# Patient Record
Sex: MALE | Race: WHITE | NOT HISPANIC OR LATINO | Employment: FULL TIME | URBAN - METROPOLITAN AREA
[De-identification: names, ages, dates, MRNs, and addresses within clinical notes are randomized per-mention and may not be internally consistent; named-entity substitution may affect disease eponyms.]

---

## 2018-03-15 ENCOUNTER — OFFICE VISIT CONVERTED (OUTPATIENT)
Dept: GASTROENTEROLOGY | Facility: CLINIC | Age: 53
End: 2018-03-15
Attending: PHYSICIAN ASSISTANT

## 2018-04-18 ENCOUNTER — CONVERSION ENCOUNTER (OUTPATIENT)
Dept: GASTROENTEROLOGY | Facility: CLINIC | Age: 53
End: 2018-04-18

## 2018-04-18 ENCOUNTER — OFFICE VISIT CONVERTED (OUTPATIENT)
Dept: GASTROENTEROLOGY | Facility: CLINIC | Age: 53
End: 2018-04-18
Attending: INTERNAL MEDICINE

## 2018-07-13 ENCOUNTER — OFFICE VISIT CONVERTED (OUTPATIENT)
Dept: SURGERY | Facility: CLINIC | Age: 53
End: 2018-07-13
Attending: UROLOGY

## 2019-03-27 ENCOUNTER — OFFICE VISIT CONVERTED (OUTPATIENT)
Dept: FAMILY MEDICINE CLINIC | Facility: CLINIC | Age: 54
End: 2019-03-27
Attending: NURSE PRACTITIONER

## 2019-03-27 ENCOUNTER — CONVERSION ENCOUNTER (OUTPATIENT)
Dept: FAMILY MEDICINE CLINIC | Facility: CLINIC | Age: 54
End: 2019-03-27

## 2019-09-30 ENCOUNTER — CONVERSION ENCOUNTER (OUTPATIENT)
Dept: FAMILY MEDICINE CLINIC | Facility: CLINIC | Age: 54
End: 2019-09-30

## 2019-09-30 ENCOUNTER — OFFICE VISIT CONVERTED (OUTPATIENT)
Dept: FAMILY MEDICINE CLINIC | Facility: CLINIC | Age: 54
End: 2019-09-30
Attending: NURSE PRACTITIONER

## 2020-04-09 ENCOUNTER — TELEMEDICINE CONVERTED (OUTPATIENT)
Dept: FAMILY MEDICINE CLINIC | Facility: CLINIC | Age: 55
End: 2020-04-09
Attending: NURSE PRACTITIONER

## 2021-01-06 ENCOUNTER — OFFICE VISIT CONVERTED (OUTPATIENT)
Dept: FAMILY MEDICINE CLINIC | Facility: CLINIC | Age: 56
End: 2021-01-06
Attending: NURSE PRACTITIONER
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
## 2021-05-14 VITALS
DIASTOLIC BLOOD PRESSURE: 82 MMHG | SYSTOLIC BLOOD PRESSURE: 131 MMHG | BODY MASS INDEX: 33.59 KG/M2 | WEIGHT: 248 LBS | HEART RATE: 90 BPM | HEIGHT: 72 IN | OXYGEN SATURATION: 96 %
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
## 2021-05-15 VITALS
WEIGHT: 265.25 LBS | DIASTOLIC BLOOD PRESSURE: 88 MMHG | BODY MASS INDEX: 35.93 KG/M2 | SYSTOLIC BLOOD PRESSURE: 128 MMHG | HEART RATE: 120 BPM | HEIGHT: 72 IN | OXYGEN SATURATION: 96 %

## 2021-05-15 VITALS
HEIGHT: 72 IN | DIASTOLIC BLOOD PRESSURE: 96 MMHG | SYSTOLIC BLOOD PRESSURE: 139 MMHG | OXYGEN SATURATION: 96 % | BODY MASS INDEX: 35.69 KG/M2 | HEART RATE: 75 BPM | WEIGHT: 263.5 LBS

## 2021-05-16 VITALS — BODY MASS INDEX: 34.27 KG/M2 | RESPIRATION RATE: 16 BRPM | HEIGHT: 72 IN | WEIGHT: 253 LBS

## 2021-05-16 VITALS
RESPIRATION RATE: 12 BRPM | OXYGEN SATURATION: 97 % | DIASTOLIC BLOOD PRESSURE: 80 MMHG | SYSTOLIC BLOOD PRESSURE: 150 MMHG | BODY MASS INDEX: 34.17 KG/M2 | WEIGHT: 252.25 LBS | HEIGHT: 72 IN | HEART RATE: 94 BPM

## 2021-05-16 VITALS
DIASTOLIC BLOOD PRESSURE: 93 MMHG | BODY MASS INDEX: 34.28 KG/M2 | SYSTOLIC BLOOD PRESSURE: 158 MMHG | RESPIRATION RATE: 12 BRPM | HEIGHT: 72 IN | WEIGHT: 253.12 LBS | HEART RATE: 99 BPM

## 2021-12-23 PROBLEM — E78.1 HYPERTRIGLYCERIDEMIA: Status: ACTIVE | Noted: 2021-12-23

## 2021-12-23 PROBLEM — F41.9 ANXIETY: Status: ACTIVE | Noted: 2021-12-23

## 2021-12-23 PROBLEM — E78.5 HYPERLIPIDEMIA: Status: ACTIVE | Noted: 2021-12-23

## 2021-12-23 PROBLEM — M54.50 LOW BACK PAIN: Status: ACTIVE | Noted: 2021-12-23

## 2021-12-23 PROBLEM — R42 VERTIGO: Status: ACTIVE | Noted: 2021-12-23

## 2021-12-23 PROBLEM — K21.9 ESOPHAGEAL REFLUX: Status: ACTIVE | Noted: 2021-12-23

## 2021-12-23 PROBLEM — F32.A DEPRESSION: Status: ACTIVE | Noted: 2021-12-23

## 2021-12-23 PROBLEM — K76.9 LIVER DISORDER: Status: ACTIVE | Noted: 2021-12-23

## 2021-12-23 PROBLEM — F43.10 PTSD (POST-TRAUMATIC STRESS DISORDER): Status: ACTIVE | Noted: 2021-12-23

## 2021-12-23 PROBLEM — K50.90 CROHN'S DISEASE: Status: ACTIVE | Noted: 2021-12-23

## 2021-12-23 RX ORDER — TRAZODONE HYDROCHLORIDE 100 MG/1
100 TABLET ORAL NIGHTLY
COMMUNITY

## 2021-12-23 RX ORDER — ASPIRIN 81 MG/1
TABLET ORAL
COMMUNITY
End: 2022-01-06

## 2021-12-23 RX ORDER — FEXOFENADINE HCL 180 MG/1
180 TABLET ORAL DAILY
COMMUNITY

## 2021-12-23 RX ORDER — CITALOPRAM 40 MG/1
TABLET ORAL
COMMUNITY
End: 2022-01-06

## 2021-12-23 RX ORDER — PANTOPRAZOLE SODIUM 40 MG/1
TABLET, DELAYED RELEASE ORAL
COMMUNITY
End: 2022-12-08

## 2021-12-23 RX ORDER — PRAZOSIN HYDROCHLORIDE 2 MG/1
8 CAPSULE ORAL NIGHTLY
COMMUNITY

## 2021-12-23 RX ORDER — ROSUVASTATIN CALCIUM 5 MG/1
TABLET, COATED ORAL
COMMUNITY
End: 2022-01-06

## 2021-12-23 RX ORDER — QUETIAPINE 200 MG/1
200 TABLET, FILM COATED, EXTENDED RELEASE ORAL NIGHTLY
COMMUNITY

## 2021-12-23 RX ORDER — SILDENAFIL 100 MG/1
100 TABLET, FILM COATED ORAL AS NEEDED
COMMUNITY

## 2021-12-23 RX ORDER — MESALAMINE 1.2 G/1
1200 TABLET, DELAYED RELEASE ORAL 4 TIMES DAILY
COMMUNITY

## 2021-12-23 RX ORDER — EPINEPHRINE 0.3 MG/.3ML
0.3 INJECTION SUBCUTANEOUS ONCE
COMMUNITY

## 2021-12-23 RX ORDER — ROSUVASTATIN CALCIUM 40 MG/1
20 TABLET, COATED ORAL NIGHTLY
COMMUNITY

## 2021-12-23 RX ORDER — HYDROXYZINE 50 MG/1
50 TABLET, FILM COATED ORAL NIGHTLY
COMMUNITY

## 2021-12-23 RX ORDER — RANITIDINE 150 MG/1
CAPSULE ORAL
COMMUNITY
End: 2022-01-06

## 2021-12-23 RX ORDER — OXCARBAZEPINE 300 MG/1
TABLET, FILM COATED ORAL
COMMUNITY
End: 2022-01-06

## 2021-12-23 RX ORDER — ERGOCALCIFEROL 1.25 MG/1
50000 CAPSULE ORAL WEEKLY
COMMUNITY

## 2021-12-23 RX ORDER — TAMSULOSIN HYDROCHLORIDE 0.4 MG/1
1 CAPSULE ORAL DAILY
COMMUNITY

## 2021-12-23 RX ORDER — LAMOTRIGINE 200 MG/1
TABLET ORAL
COMMUNITY
End: 2022-01-06

## 2021-12-23 RX ORDER — ONDANSETRON 8 MG/1
8 TABLET, ORALLY DISINTEGRATING ORAL EVERY 8 HOURS PRN
COMMUNITY

## 2022-01-06 ENCOUNTER — OFFICE VISIT (OUTPATIENT)
Dept: FAMILY MEDICINE CLINIC | Facility: CLINIC | Age: 57
End: 2022-01-06

## 2022-01-06 VITALS
DIASTOLIC BLOOD PRESSURE: 80 MMHG | HEART RATE: 134 BPM | HEIGHT: 72 IN | OXYGEN SATURATION: 95 % | BODY MASS INDEX: 34.95 KG/M2 | WEIGHT: 258 LBS | SYSTOLIC BLOOD PRESSURE: 122 MMHG

## 2022-01-06 DIAGNOSIS — Z00.00 ANNUAL PHYSICAL EXAM: Primary | ICD-10-CM

## 2022-01-06 PROCEDURE — 99396 PREV VISIT EST AGE 40-64: CPT | Performed by: NURSE PRACTITIONER
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
## 2022-02-11 ENCOUNTER — TRANSCRIBE ORDERS (OUTPATIENT)
Dept: PHYSICAL THERAPY | Facility: CLINIC | Age: 57
End: 2022-02-11

## 2022-02-11 DIAGNOSIS — R42 DIZZINESS AND GIDDINESS: Primary | ICD-10-CM

## 2022-03-22 ENCOUNTER — TREATMENT (OUTPATIENT)
Dept: PHYSICAL THERAPY | Facility: CLINIC | Age: 57
End: 2022-03-22

## 2022-03-22 DIAGNOSIS — R26.89 IMBALANCE: ICD-10-CM

## 2022-03-22 DIAGNOSIS — R42 DIZZINESS: Primary | ICD-10-CM

## 2022-03-22 DIAGNOSIS — R26.9 GAIT DISTURBANCE: ICD-10-CM

## 2022-03-22 PROCEDURE — 95992 CANALITH REPOSITIONING PROC: CPT | Performed by: PHYSICAL THERAPIST

## 2022-03-22 PROCEDURE — 97161 PT EVAL LOW COMPLEX 20 MIN: CPT | Performed by: PHYSICAL THERAPIST
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
## 2022-04-04 ENCOUNTER — TREATMENT (OUTPATIENT)
Dept: PHYSICAL THERAPY | Facility: CLINIC | Age: 57
End: 2022-04-04

## 2022-04-04 DIAGNOSIS — R42 DIZZINESS: ICD-10-CM

## 2022-04-04 DIAGNOSIS — R26.9 GAIT DISTURBANCE: Primary | ICD-10-CM

## 2022-04-04 DIAGNOSIS — R26.89 IMBALANCE: ICD-10-CM

## 2022-04-04 PROCEDURE — 95992 CANALITH REPOSITIONING PROC: CPT | Performed by: PHYSICAL THERAPIST

## 2022-04-04 PROCEDURE — 97112 NEUROMUSCULAR REEDUCATION: CPT | Performed by: PHYSICAL THERAPIST

## 2022-04-04 NOTE — PROGRESS NOTES
Physical Therapy Daily Progress Note    VISIT#: 2    Subjective   Chai Gaston reports he has been doing his exercises consistently at home.  He states his symptoms are getting a little better since starting them.  He reports he has fallen twice since I last saw him due to his R leg dragging after doing visual exercises.     Objective   L tahir halpike: negative  R tahir halpike: positive  Horizontal canal test: NT     Smooth pursuits horizontal: dizziness  Smooth pursuits vertical: dizziness  Saccades horizontal: dizziness, increased speed today  Saccades vertical: dizziness,  increased speed today  VOR horizontal: dizziness, increased speed today  VOR vertical: dizziness,  increased speed today    See Exercise, Manual, and Modality Logs for complete treatment.     Patient Education: add candie-za to HEP    Assessment/Plan  Pt had positive tahir halpike to the R side today therefore epley maneuver was performed two times to that side.  Pt shows improved speed on saccades and VOR horizontal/vertical today compared to the initial evaluation.  Whyte-za was provided for home program.  Will perform tahir halpike again next session.    Progress per Plan of Care and Progress strengthening /stabilization /functional activity            Timed:         Manual Therapy:        mins  04584;     Therapeutic Exercise:         mins  07161;     Neuromuscular Graeme:    10    mins  91634;    Therapeutic Activity:          mins  64310;     Gait Training:           mins  01425;     Ultrasound:          mins  33868;    Ionto                                   mins   57516  Self Care                            mins   55488      Un-Timed:  Electrical Stimulation:         mins  52503 ( );  Dry Needling          mins self-pay  Traction          mins 60256  Low Eval          Mins  86312  Mod Eval          Mins  45791  High Eval                            Mins  88258  Re-Eval                               mins  21174  Canalith  Repos               15    mins  01486    Timed Treatment:   10   mins   Total Treatment:     25   mins    Sandra Boone PT  Physical Therapist  [place] License: [id number]

## 2022-04-14 ENCOUNTER — TREATMENT (OUTPATIENT)
Dept: PHYSICAL THERAPY | Facility: CLINIC | Age: 57
End: 2022-04-14

## 2022-04-14 DIAGNOSIS — R26.9 GAIT DISTURBANCE: Primary | ICD-10-CM

## 2022-04-14 DIAGNOSIS — R26.89 IMBALANCE: ICD-10-CM

## 2022-04-14 DIAGNOSIS — R42 DIZZINESS: ICD-10-CM

## 2022-04-14 PROCEDURE — 97112 NEUROMUSCULAR REEDUCATION: CPT | Performed by: PHYSICAL THERAPIST

## 2022-04-14 NOTE — PROGRESS NOTES
Physical Therapy Daily Progress Note    VISIT#: 3    Subjective   Chai Gaston reports he has been doing the eye exercises at home and they are getting easier.  Pt reports dizziness when getting up out of a chair or out of a car.  He states it is really bad if he is driving, especially in traffic.      Objective   L tahir halpike: negative  R tahir halpike: negative  Horizontal canal test: negative    Romberg on floor: 30 seconds  Romberg on floor/eyes closed: 4 seconds  Romberg on foam: 4 seconds  Romberg on foam/eyes closed: not tested  Sharpened romberg on floor: 5 seconds  Sharpened romberg on floor/eyes closed: not tested  Sharpened romberg on foam: not tested  Sharpened romberg on foam/eyes closed: not tested    See Exercise, Manual, and Modality Logs for complete treatment.     Assessment/Plan  Tahir halpike is negative today therefore epley was not performed.  However, pt does have nausea during testing.  Baseline balance testing was performed today and pt has a significant balance deficit that is worse with eyes closed.  Whyte daroff was added today as well and pt was instructed to add this to his home program and provided with a handout.  Pt has poor tolerance of whyte-daroff due to nausea.  Will continue advancing balance/visual/vestibular exercises to improve tolerance to functional activities.     Progress per Plan of Care and Progress strengthening /stabilization /functional activity            Timed:         Manual Therapy:         mins  62491;     Therapeutic Exercise:         mins  15912;     Neuromuscular Graeme:    26    mins  21928;    Therapeutic Activity:          mins  88290;     Gait Training:           mins  61756;     Ultrasound:          mins  29040;    Ionto                                   mins   97644  Self Care                            mins   96670  Canalith Repos                   mins  05906    Un-Timed:  Electrical Stimulation:         mins  79297 ( );  Dry Needling           mins self-pay  Traction          mins 74522  Low Eval          Mins  84411  Mod Eval          Mins  61977  High Eval                            Mins  52290  Re-Eval                               mins  65906    Timed Treatment:   26   mins   Total Treatment:     26   mins    Sandra Boone PT, DPT  License number [id number]

## 2022-04-15 ENCOUNTER — TREATMENT (OUTPATIENT)
Dept: PHYSICAL THERAPY | Facility: CLINIC | Age: 57
End: 2022-04-15

## 2022-04-15 DIAGNOSIS — R26.89 IMBALANCE: ICD-10-CM

## 2022-04-15 DIAGNOSIS — R26.9 GAIT DISTURBANCE: Primary | ICD-10-CM

## 2022-04-15 DIAGNOSIS — R42 DIZZINESS: ICD-10-CM

## 2022-04-15 PROCEDURE — 97112 NEUROMUSCULAR REEDUCATION: CPT | Performed by: PHYSICAL THERAPIST

## 2022-04-15 NOTE — PROGRESS NOTES
Physical Therapy Daily Progress Note    VISIT#: 4    Subjective   Chai Gaston reports feeling very sick today. He had a lot of nausea while driving here today.     Objective     See Exercise, Manual, and Modality Logs for complete treatment.       Assessment/Plan  Pt continues to have significant loss of balance with exercises where his eyes are closed.  Pt requires prolonged rest breaks in between exercises to get rid of his nausea. Pt has frequent loss of balance during exercises however he demonstrates abnormal balance strategies in order to recover balance, such as excessive UE movement.  During balance exercises pt was able to regain balance without PT assistance.  Will continue advancing exercises as tolerated.     Progress per Plan of Care and Progress strengthening /stabilization /functional activity            Timed:         Manual Therapy:         mins  42103;     Therapeutic Exercise:         mins  84569;     Neuromuscular Graeme:    27    mins  17745;    Therapeutic Activity:          mins  06845;     Gait Training:           mins  71451;     Ultrasound:          mins  08846;    Ionto                                   mins   56542  Self Care                            mins   45198  Canalith Repos                   mins  00914    Un-Timed:  Electrical Stimulation:         mins  85898 ( );  Dry Needling          mins self-pay  Traction          mins 95689  Low Eval          Mins  34602  Mod Eval          Mins  70641  High Eval                            Mins  28092  Re-Eval                               mins  67015    Timed Treatment:   27   mins   Total Treatment:     27   mins    Sandra Boone PT, DPT  License number: [id number]

## 2022-04-19 ENCOUNTER — TREATMENT (OUTPATIENT)
Dept: PHYSICAL THERAPY | Facility: CLINIC | Age: 57
End: 2022-04-19

## 2022-04-19 PROCEDURE — 97112 NEUROMUSCULAR REEDUCATION: CPT | Performed by: PHYSICAL THERAPIST

## 2022-04-19 NOTE — PROGRESS NOTES
Physical Therapy Daily Progress Note    VISIT#: 5    Subjective   Chai Gaston reports being tired and feeling a little less dizzy today. Pt reports still having dizziness when getting out of bed. Pt reports having more dizziness with HEP involving moving head up and down.      Objective     See Exercise, Manual, and Modality Logs for complete treatment.       Assessment/Plan  Pt is still experiencing significant dizziness when performing head turns.  Pt tolerated standing in romberg with head turns better with not as much nausea till the end of the second set. Pt seemed to not have as much loss of balance when performing his exercises today. Pt was able to use stepping strategies a couple times to regain his balance during exercises. Pt will benefit from progressing his balance exercises to decrease his nausea and dizziness during his daily activities.     Progress per Plan of Care and Progress strengthening /stabilization /functional activity            Timed:         Manual Therapy:         mins  19498;     Therapeutic Exercise:         mins  82151;     Neuromuscular Graeme:    30    mins  25005;    Therapeutic Activity:          mins  69454;     Gait Training:           mins  49899;     Ultrasound:          mins  15055;    Ionto                                   mins   62703  Self Care                            mins   58718  Canalith Repos                   mins  43257    Un-Timed:  Electrical Stimulation:         mins  63197 ( );  Dry Needling          mins self-pay  Traction          mins 02440  Low Eval          Mins  01695  Mod Eval          Mins  70379  High Eval                            Mins  04013  Re-Eval                               mins  20242    Timed Treatment:   30   mins   Total Treatment:     30   mins    Destiney Faulkner Physical therapist student

## 2022-04-21 ENCOUNTER — TREATMENT (OUTPATIENT)
Dept: PHYSICAL THERAPY | Facility: CLINIC | Age: 57
End: 2022-04-21

## 2022-04-21 DIAGNOSIS — R26.89 IMBALANCE: ICD-10-CM

## 2022-04-21 DIAGNOSIS — R26.9 GAIT DISTURBANCE: ICD-10-CM

## 2022-04-21 DIAGNOSIS — R42 DIZZINESS: Primary | ICD-10-CM

## 2022-04-21 PROCEDURE — 97112 NEUROMUSCULAR REEDUCATION: CPT | Performed by: PHYSICAL THERAPIST

## 2022-04-21 NOTE — PROGRESS NOTES
Progress Assessment        Patient: Chai Gaston   : 1965  Diagnosis/ICD-10 Code:  Dizziness [R42]  Referring practitioner: MARCO Campuzano  Date of Initial Visit: Type: THERAPY  Noted: 3/22/2022  Today's Date: 2022  Patient seen for 6 sessions      Subjective:   Chai Gaston reports: driving is still very difficult especially on the freeways. Pt reports his HEP up and down exercises still make him dizzy but the side to side has gotten easier. Pt is still having to move slowly to get in and out of bed to decrease how much nausea he experiences. Pt reports that he still has some of the flickering of his eyes, but it has improved. Pt reports that his balance has gotten a little better except when standing on foam. Pt reports having his R foot drag when he's walking sometimes. Pt reports nausea has gotten better.     Subjective Questionnaire: DHI: 70%  Clinical Progress: improved  Home Program Compliance: Yes  Treatment has included: therapeutic exercise and neuromuscular re-education    Subjective     Objective     Smooth pursuits horizontal: dizziness  Smooth pursuits vertical: dizziness  Saccades horizontal: less dizziness, normal speed  Saccades vertical: dizziness, improved speed speed  VOR horizontal: dizziness, decreased speed  VOR vertical: Less dizziness, improved speed  Near point convergence: NT     Romberg on floor: 30 seconds  Romberg on floor/eyes closed: 7 seconds  Romberg on foam: 18 seconds  Romberg on foam/eyes closed: not tested  Sharpened romberg on floor: 8 seconds  Sharpened romberg on floor/eyes closed: not tested  Sharpened romberg on foam: not tested  Sharpened romberg on foam/eyes closed: not tested    Assessment/Plan   Pt's balance has improved some but continues to have significant difficulty when standing on the foam or having eyes closed. Pt continues to have frequent loss of balance during exercises and demonstrates abnormal balance strategies including  excessive UE movement. Pt score on the DHI has improved indicating greater tolerance to functional activities. Pt will continue to benefit from habituation exercises to decrease symptoms of nausea and dizziness.     Goals  Plan Goals: 1. Mobility: Walking/Moving Around Functional Limitation                       LTG 1: 12 weeks:  The patient will demonstrate 40% limitation by achieving a score of 40 on the Dizziness Handicap Inventory.   STATUS:  progressing  STG 1a: 6 weeks:  The patient will demonstrate 50% limitation by achieving a score of 50 on the Dizziness Handicap Inventory.     STATUS:  progressing      2. The patient has difficulty with balance.   LTG 2: 12 weeks: The patient will hold the sharpened romberg position on floor with eyes closed for 30 seconds in order to improve balance and decrease risk of falling.   STATUS: progressing   STG 2: 6 weeks: The patient will hold the romberg position on foam with eyes closed for 30 seconds in order to improve balance and decrease risk of falling.   STATUS: progressing     3. The patient has dizziness.   LTG 3: 12 weeks: The patient will report a 50% improvement in dizziness in order to improve tolerance to functional activities.  STATUS: progressing   STG 2: 6 weeks: The patient will report a 25% improvement in dizziness in order to improve tolerance to functional activities.  STATUS: progressing     Progress toward previous goals: Partially Met    See Exercise, Manual, and Modality Logs for complete treatment.         Recommendations: Continue as planned  Timeframe: 3 months  Prognosis to achieve goals: good    PT Signature: Electronically signed by Destiney Faulkner, Physical Therapist Student        Based upon review of the patient's progress and continued therapy plan, it is my medical opinion that Chai Gaston should continue physical therapy treatment at [facility]  [address]  [place]  [address]  [phone].      Timed:         Manual Therapy:         mins  40326;     Therapeutic Exercise:         mins  49571;     Neuromuscular Graeme:    38    mins  99071;    Therapeutic Activity:          mins  49489;     Gait Training:           mins  64342;     Ultrasound:          mins  87424;    Ionto                                   mins   17239  Self Care                            mins   13152  Aquatic                               mins 25749      Un-Timed:  Electrical Stimulation:         mins  76514 ( );  Dry Needling          mins self-pay  Traction          mins 64030  Low Eval          Mins  19495  Mod Eval          Mins  41614  High Eval                            Mins  71961  Re-Eval                               mins  70770      Timed Treatment:  38    mins   Total Treatment:     38   mins      I certify that the therapy services are furnished while this patient is under my care.  The services outlined above are required by this patient, and will be reviewed every 90 days.

## 2022-04-26 ENCOUNTER — TREATMENT (OUTPATIENT)
Dept: PHYSICAL THERAPY | Facility: CLINIC | Age: 57
End: 2022-04-26

## 2022-04-26 DIAGNOSIS — R42 DIZZINESS: ICD-10-CM

## 2022-04-26 DIAGNOSIS — R26.89 IMBALANCE: ICD-10-CM

## 2022-04-26 DIAGNOSIS — R26.9 GAIT DISTURBANCE: Primary | ICD-10-CM

## 2022-04-26 PROCEDURE — 97112 NEUROMUSCULAR REEDUCATION: CPT | Performed by: PHYSICAL THERAPIST

## 2022-04-26 NOTE — PROGRESS NOTES
Physical Therapy Daily Progress Note    VISIT#: 7    Subjective   Chai Gaston reports he tried to advance his HEP on his own by closing his eyes causing him to fall and sprain his ankle. Pt reports getting a headache half way through session.       Objective     See Exercise, Manual, and Modality Logs for complete treatment.       Assessment/Plan  Pt had an increased tolerance to performing sharpened romberg with less loss of balance. Pt was very unsteady during standing in romberg and throwing ball. Pt continues to have significant dizziness and loss of balance when dual tasking or closing eyes. Pt was educated not to perform HEP with eyes closed because he is too unsteady to progress exercises at home. Pt will benefit from progressing exercises to involve more dual tasks to decrease his dizziness during functional activities.     Progress per Plan of Care and Progress strengthening /stabilization /functional activity            Timed:         Manual Therapy:         mins  73112;     Therapeutic Exercise:         mins  08639;     Neuromuscular Graeme:    29    mins  31604;    Therapeutic Activity:          mins  97445;     Gait Training:           mins  90552;     Ultrasound:          mins  84488;    Ionto                                   mins   86161  Self Care                            mins   60456  Canalith Repos                   mins  86739    Un-Timed:  Electrical Stimulation:         mins  83900 ( );  Dry Needling          mins self-pay  Traction          mins 58342  Low Eval          Mins  72032  Mod Eval          Mins  42565  High Eval                            Mins  87953  Re-Eval                               mins  25011    Timed Treatment:   29   mins   Total Treatment:     29   mins    Destiney Faulkner Physical Therapist Student

## 2022-04-28 ENCOUNTER — TREATMENT (OUTPATIENT)
Dept: PHYSICAL THERAPY | Facility: CLINIC | Age: 57
End: 2022-04-28

## 2022-04-28 DIAGNOSIS — R26.89 IMBALANCE: ICD-10-CM

## 2022-04-28 DIAGNOSIS — R26.9 GAIT DISTURBANCE: Primary | ICD-10-CM

## 2022-04-28 DIAGNOSIS — R42 DIZZINESS: ICD-10-CM

## 2022-04-28 PROCEDURE — 97112 NEUROMUSCULAR REEDUCATION: CPT | Performed by: PHYSICAL THERAPIST

## 2022-04-28 NOTE — PROGRESS NOTES
Physical Therapy Daily Progress Note    VISIT#: 8    Subjective   Chai Gaston reports that his R ear feels like there is water in his ear. Pt took tramadol today because he was feeling dizzy and nauseas.     Objective     See Exercise, Manual, and Modality Logs for complete treatment.       Assessment/Plan  Pt had significant loss of balance and dizziness with all exercises. Pt's exercises were not advanced due to being so unsteady with the use of abnormal balance strategies. Pt will benefit from continued balance exercises to increase tolerance during functional activities.      Progress per Plan of Care and Progress strengthening /stabilization /functional activity            Timed:         Manual Therapy:         mins  19832;     Therapeutic Exercise:         mins  47788;     Neuromuscular Graeme:    27    mins  20089;    Therapeutic Activity:          mins  34133;     Gait Training:           mins  21564;     Ultrasound:          mins  14413;    Ionto                                   mins   25281  Self Care                            mins   79226  Canalith Repos                   mins  88297    Un-Timed:  Electrical Stimulation:         mins  61732 ( );  Dry Needling          mins self-pay  Traction          mins 70175  Low Eval          Mins  30553  Mod Eval          Mins  95210  High Eval                            Mins  65170  Re-Eval                               mins  90313    Timed Treatment:   27   mins   Total Treatment:     27   mins    Destiney Faulkner, Physical Therapist Student

## 2022-05-03 ENCOUNTER — TREATMENT (OUTPATIENT)
Dept: PHYSICAL THERAPY | Facility: CLINIC | Age: 57
End: 2022-05-03

## 2022-05-03 DIAGNOSIS — R42 DIZZINESS: Primary | ICD-10-CM

## 2022-05-03 DIAGNOSIS — R26.9 GAIT DISTURBANCE: ICD-10-CM

## 2022-05-03 DIAGNOSIS — R26.89 IMBALANCE: ICD-10-CM

## 2022-05-03 PROCEDURE — 97112 NEUROMUSCULAR REEDUCATION: CPT | Performed by: PHYSICAL THERAPIST

## 2022-05-03 NOTE — PROGRESS NOTES
Physical Therapy Daily Progress Note    VISIT#: 9    Subjective   Chai Gaston reports just a little dizzy today.     Objective     See Exercise, Manual, and Modality Logs for complete treatment.       Assessment/Plan  Pt was able to perform sharpened romberg with less  loss of balance. Pt continues to have difficulty with keeping his balance with head turns. Pt needed cueing to tighten abdominals and gluteus rianna when performing sit to stands to minimize UE and trunk movement which causes him to become more off balance. Pt will benefit from continuing with balance and habituation exercises to increase his tolerance to functional activities.     Progress per Plan of Care and Progress strengthening /stabilization /functional activity            Timed:         Manual Therapy:         mins  23724;     Therapeutic Exercise:         mins  40045;     Neuromuscular Graeme:    29    mins  87335;    Therapeutic Activity:          mins  30730;     Gait Training:           mins  23194;     Ultrasound:          mins  19850;    Ionto                                   mins   28790  Self Care                            mins   16829  Canalith Repos                   mins  11550    Un-Timed:  Electrical Stimulation:         mins  55462 ( );  Dry Needling          mins self-pay  Traction          mins 72844  Low Eval          Mins  61768  Mod Eval          Mins  46474  High Eval                            Mins  69884  Re-Eval                               mins  73496    Timed Treatment:   29   mins   Total Treatment:     29   mins    Desitney Faulkner Physical Therapist Student

## 2022-05-05 ENCOUNTER — TREATMENT (OUTPATIENT)
Dept: PHYSICAL THERAPY | Facility: CLINIC | Age: 57
End: 2022-05-05

## 2022-05-05 DIAGNOSIS — R42 DIZZINESS: Primary | ICD-10-CM

## 2022-05-05 DIAGNOSIS — R26.89 IMBALANCE: ICD-10-CM

## 2022-05-05 DIAGNOSIS — R26.9 GAIT DISTURBANCE: ICD-10-CM

## 2022-05-05 PROCEDURE — 97112 NEUROMUSCULAR REEDUCATION: CPT | Performed by: PHYSICAL THERAPIST

## 2022-05-05 NOTE — PROGRESS NOTES
Physical Therapy Daily Progress Note    VISIT#: 10    Subjective   Chai Gaston reports that his eyes have been flickering back and forth a lot more than usual. Pt reports being really tired and feeling like his dizziness is worse because of lack of sleep.     Objective     See Exercise, Manual, and Modality Logs for complete treatment.       Assessment/Plan  Pt was advanced to sharpened romberg with head turns with good tolerance. Pt was able to perform romberg on the foam with no loss of balance. Pt experienced significant dizziness and loss of balance during dynamic exercises. More dynamic exercises should be included next session to improve tolerance. Pt will continue to benefit from continuing with habituation exercises to improve tolerance to functional activities.     Progress per Plan of Care and Progress strengthening /stabilization /functional activity            Timed:         Manual Therapy:         mins  57144;     Therapeutic Exercise:         mins  14124;     Neuromuscular Graeme:  26      mins  95806;    Therapeutic Activity:          mins  72586;     Gait Training:           mins  62779;     Ultrasound:         mins  44111;    Ionto                                   mins   78718  Self Care                            mins   27687  Canalith Repos                   mins  82742    Un-Timed:  Electrical Stimulation:         mins  37157 ( );  Dry Needling          mins self-pay  Traction          mins 92870  Low Eval          Mins  09843  Mod Eval          Mins  82774  High Eval                            Mins  61609  Re-Eval                               mins  91957    Timed Treatment:   26   mins   Total Treatment:     26   mins    Destiney Faulkner Physical Therapist Student

## 2022-05-10 ENCOUNTER — HOSPITAL ENCOUNTER (EMERGENCY)
Facility: HOSPITAL | Age: 57
Discharge: HOME | End: 2022-05-10
Attending: EMERGENCY MEDICINE | Admitting: EMERGENCY MEDICINE

## 2022-05-10 ENCOUNTER — APPOINTMENT (OUTPATIENT)
Dept: GENERAL RADIOLOGY | Facility: HOSPITAL | Age: 57
End: 2022-05-10

## 2022-05-10 VITALS
SYSTOLIC BLOOD PRESSURE: 130 MMHG | DIASTOLIC BLOOD PRESSURE: 86 MMHG | TEMPERATURE: 98.1 F | WEIGHT: 255.73 LBS | OXYGEN SATURATION: 95 % | HEIGHT: 72 IN | RESPIRATION RATE: 16 BRPM | HEART RATE: 110 BPM | BODY MASS INDEX: 34.64 KG/M2

## 2022-05-10 DIAGNOSIS — J98.9 RESPIRATORY ILLNESS: ICD-10-CM

## 2022-05-10 DIAGNOSIS — R05.9 COUGH: Primary | ICD-10-CM

## 2022-05-10 LAB
ALBUMIN SERPL-MCNC: 4.4 G/DL (ref 3.5–5.2)
ALBUMIN/GLOB SERPL: 1.8 G/DL
ALP SERPL-CCNC: 54 U/L (ref 39–117)
ALT SERPL W P-5'-P-CCNC: 52 U/L (ref 1–41)
ANION GAP SERPL CALCULATED.3IONS-SCNC: 13.8 MMOL/L (ref 5–15)
AST SERPL-CCNC: 41 U/L (ref 1–40)
BASOPHILS # BLD AUTO: 0.03 10*3/MM3 (ref 0–0.2)
BASOPHILS NFR BLD AUTO: 0.7 % (ref 0–1.5)
BILIRUB SERPL-MCNC: 0.4 MG/DL (ref 0–1.2)
BUN SERPL-MCNC: 21 MG/DL (ref 6–20)
BUN/CREAT SERPL: 17.9 (ref 7–25)
CALCIUM SPEC-SCNC: 9.3 MG/DL (ref 8.6–10.5)
CHLORIDE SERPL-SCNC: 105 MMOL/L (ref 98–107)
CO2 SERPL-SCNC: 21.2 MMOL/L (ref 22–29)
CREAT SERPL-MCNC: 1.17 MG/DL (ref 0.76–1.27)
DEPRECATED RDW RBC AUTO: 39.7 FL (ref 37–54)
EGFRCR SERPLBLD CKD-EPI 2021: 71.8 ML/MIN/1.73
EOSINOPHIL # BLD AUTO: 0.16 10*3/MM3 (ref 0–0.4)
EOSINOPHIL NFR BLD AUTO: 3.5 % (ref 0.3–6.2)
ERYTHROCYTE [DISTWIDTH] IN BLOOD BY AUTOMATED COUNT: 12.5 % (ref 12.3–15.4)
FLUAV AG NPH QL: NEGATIVE
FLUBV AG NPH QL IA: NEGATIVE
GLOBULIN UR ELPH-MCNC: 2.5 GM/DL
GLUCOSE SERPL-MCNC: 109 MG/DL (ref 65–99)
HCT VFR BLD AUTO: 46.1 % (ref 37.5–51)
HGB BLD-MCNC: 15.5 G/DL (ref 13–17.7)
HOLD SPECIMEN: NORMAL
HOLD SPECIMEN: NORMAL
IMM GRANULOCYTES # BLD AUTO: 0.03 10*3/MM3 (ref 0–0.05)
IMM GRANULOCYTES NFR BLD AUTO: 0.7 % (ref 0–0.5)
LYMPHOCYTES # BLD AUTO: 1.18 10*3/MM3 (ref 0.7–3.1)
LYMPHOCYTES NFR BLD AUTO: 25.9 % (ref 19.6–45.3)
MCH RBC QN AUTO: 28.9 PG (ref 26.6–33)
MCHC RBC AUTO-ENTMCNC: 33.6 G/DL (ref 31.5–35.7)
MCV RBC AUTO: 86 FL (ref 79–97)
MONOCYTES # BLD AUTO: 0.71 10*3/MM3 (ref 0.1–0.9)
MONOCYTES NFR BLD AUTO: 15.6 % (ref 5–12)
NEUTROPHILS NFR BLD AUTO: 2.45 10*3/MM3 (ref 1.7–7)
NEUTROPHILS NFR BLD AUTO: 53.6 % (ref 42.7–76)
NRBC BLD AUTO-RTO: 0 /100 WBC (ref 0–0.2)
NT-PROBNP SERPL-MCNC: 43 PG/ML (ref 0–900)
PLATELET # BLD AUTO: 164 10*3/MM3 (ref 140–450)
PMV BLD AUTO: 8.9 FL (ref 6–12)
POTASSIUM SERPL-SCNC: 4.3 MMOL/L (ref 3.5–5.2)
PROT SERPL-MCNC: 6.9 G/DL (ref 6–8.5)
RBC # BLD AUTO: 5.36 10*6/MM3 (ref 4.14–5.8)
SARS-COV-2 RNA PNL SPEC NAA+PROBE: NOT DETECTED
SODIUM SERPL-SCNC: 140 MMOL/L (ref 136–145)
TROPONIN T SERPL-MCNC: <0.01 NG/ML (ref 0–0.03)
WBC NRBC COR # BLD: 4.56 10*3/MM3 (ref 3.4–10.8)
WHOLE BLOOD HOLD COAG: NORMAL
WHOLE BLOOD HOLD SPECIMEN: NORMAL

## 2022-05-10 PROCEDURE — 93005 ELECTROCARDIOGRAM TRACING: CPT | Performed by: EMERGENCY MEDICINE

## 2022-05-10 PROCEDURE — 99284 EMERGENCY DEPT VISIT MOD MDM: CPT

## 2022-05-10 PROCEDURE — 84484 ASSAY OF TROPONIN QUANT: CPT | Performed by: EMERGENCY MEDICINE

## 2022-05-10 PROCEDURE — 87804 INFLUENZA ASSAY W/OPTIC: CPT

## 2022-05-10 PROCEDURE — 93010 ELECTROCARDIOGRAM REPORT: CPT | Performed by: INTERNAL MEDICINE

## 2022-05-10 PROCEDURE — C9803 HOPD COVID-19 SPEC COLLECT: HCPCS

## 2022-05-10 PROCEDURE — 71045 X-RAY EXAM CHEST 1 VIEW: CPT

## 2022-05-10 PROCEDURE — 80053 COMPREHEN METABOLIC PANEL: CPT | Performed by: EMERGENCY MEDICINE

## 2022-05-10 PROCEDURE — 85025 COMPLETE CBC W/AUTO DIFF WBC: CPT | Performed by: EMERGENCY MEDICINE

## 2022-05-10 PROCEDURE — 83880 ASSAY OF NATRIURETIC PEPTIDE: CPT | Performed by: EMERGENCY MEDICINE

## 2022-05-10 PROCEDURE — U0004 COV-19 TEST NON-CDC HGH THRU: HCPCS

## 2022-05-10 PROCEDURE — 96360 HYDRATION IV INFUSION INIT: CPT

## 2022-05-10 PROCEDURE — 36415 COLL VENOUS BLD VENIPUNCTURE: CPT

## 2022-05-10 RX ORDER — BROMPHENIRAMINE MALEATE, PSEUDOEPHEDRINE HYDROCHLORIDE, AND DEXTROMETHORPHAN HYDROBROMIDE 2; 30; 10 MG/5ML; MG/5ML; MG/5ML
10 SYRUP ORAL ONCE
Status: COMPLETED | OUTPATIENT
Start: 2022-05-10 | End: 2022-05-10

## 2022-05-10 RX ORDER — BROMPHENIRAMINE MALEATE, PSEUDOEPHEDRINE HYDROCHLORIDE, AND DEXTROMETHORPHAN HYDROBROMIDE 2; 30; 10 MG/5ML; MG/5ML; MG/5ML
10 SYRUP ORAL 4 TIMES DAILY PRN
Qty: 240 ML | Refills: 0 | Status: SHIPPED | OUTPATIENT
Start: 2022-05-10 | End: 2022-12-08

## 2022-05-10 RX ORDER — SODIUM CHLORIDE 0.9 % (FLUSH) 0.9 %
10 SYRINGE (ML) INJECTION AS NEEDED
Status: DISCONTINUED | OUTPATIENT
Start: 2022-05-10 | End: 2022-05-10 | Stop reason: HOSPADM

## 2022-05-10 RX ORDER — BROMPHENIRAMINE MALEATE, PSEUDOEPHEDRINE HYDROCHLORIDE, AND DEXTROMETHORPHAN HYDROBROMIDE 2; 30; 10 MG/5ML; MG/5ML; MG/5ML
10 SYRUP ORAL 4 TIMES DAILY PRN
Qty: 240 ML | Refills: 0 | Status: SHIPPED | OUTPATIENT
Start: 2022-05-10 | End: 2022-05-10 | Stop reason: SDUPTHER

## 2022-05-10 RX ORDER — METHYLPREDNISOLONE 4 MG/1
TABLET ORAL
Qty: 21 TABLET | Refills: 0 | Status: SHIPPED | OUTPATIENT
Start: 2022-05-10 | End: 2022-05-10 | Stop reason: SDUPTHER

## 2022-05-10 RX ORDER — METHYLPREDNISOLONE 4 MG/1
TABLET ORAL
Qty: 21 TABLET | Refills: 0 | Status: SHIPPED | OUTPATIENT
Start: 2022-05-10 | End: 2022-12-08

## 2022-05-10 RX ADMIN — BROMPHENIRAMINE MALEATE, PSEUDOEPHEDRINE HYDROCHLORIDE, AND DEXTROMETHORPHAN HYDROBROMIDE 10 ML: 2; 30; 10 SYRUP ORAL at 08:59

## 2022-05-10 RX ADMIN — SODIUM CHLORIDE 1000 ML: 9 INJECTION, SOLUTION INTRAVENOUS at 11:26
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
## 2022-05-10 NOTE — DISCHARGE INSTRUCTIONS
Please follow-up with your primary care provider in 2-3 days if you continue to feel ill.  Take the medications that been prescribed you today as directed.  Increase your oral fluid intake.  Continue to isolate per CDC guidelines until your Covid test is resulted.  You may access Nival aryan for results.  You will only be contacted by the hospital if you are Covid test is positive.  Return to the emergency department if you develop any severe chest pain, shortness of air, or a fever that cannot be controlled with Tylenol or ibuprofen.

## 2022-05-12 ENCOUNTER — OFFICE VISIT (OUTPATIENT)
Dept: FAMILY MEDICINE CLINIC | Facility: CLINIC | Age: 57
End: 2022-05-12

## 2022-05-12 VITALS
WEIGHT: 245 LBS | HEIGHT: 72 IN | HEART RATE: 128 BPM | SYSTOLIC BLOOD PRESSURE: 123 MMHG | DIASTOLIC BLOOD PRESSURE: 85 MMHG | OXYGEN SATURATION: 94 % | BODY MASS INDEX: 33.18 KG/M2

## 2022-05-12 DIAGNOSIS — J06.9 UPPER RESPIRATORY TRACT INFECTION, UNSPECIFIED TYPE: Primary | ICD-10-CM

## 2022-05-12 DIAGNOSIS — R05.8 PRODUCTIVE COUGH: ICD-10-CM

## 2022-05-12 PROCEDURE — 99213 OFFICE O/P EST LOW 20 MIN: CPT | Performed by: NURSE PRACTITIONER

## 2022-05-12 RX ORDER — COVID-19 MOLECULAR TEST ASSAY
KIT MISCELLANEOUS
COMMUNITY
Start: 2022-05-09 | End: 2023-01-09

## 2022-05-12 RX ORDER — AZITHROMYCIN 250 MG/1
TABLET, FILM COATED ORAL
Qty: 6 TABLET | Refills: 0 | Status: SHIPPED | OUTPATIENT
Start: 2022-05-12 | End: 2022-06-02
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
## 2022-05-17 ENCOUNTER — TREATMENT (OUTPATIENT)
Dept: PHYSICAL THERAPY | Facility: CLINIC | Age: 57
End: 2022-05-17

## 2022-05-17 DIAGNOSIS — R26.89 IMBALANCE: ICD-10-CM

## 2022-05-17 DIAGNOSIS — R42 DIZZINESS: Primary | ICD-10-CM

## 2022-05-17 DIAGNOSIS — R26.9 GAIT DISTURBANCE: ICD-10-CM

## 2022-05-17 LAB — QT INTERVAL: 324 MS

## 2022-05-17 PROCEDURE — 97112 NEUROMUSCULAR REEDUCATION: CPT | Performed by: PHYSICAL THERAPIST

## 2022-05-17 NOTE — PROGRESS NOTES
Physical Therapy Daily Progress Note    VISIT#: 11    Subjective   Chai Gaston reports still feeling a little bad after being sick last week. Pt reports dizziness has been about the same.     Objective     See Exercise, Manual, and Modality Logs for complete treatment.     Assessment/Plan  Pt has significant dizziness and nausea with marching in place. Pt was able to tolerate more head turns in sharpened romberg. Pt has decreased tolerance to eyes closed balance activities. Pt continues to have difficulty with balancing on an unsteady surface. Pt will continue to benefit from habituation exercises to decrease dizziness and risk of falling.     Progress per Plan of Care and Progress strengthening /stabilization /functional activity            Timed:         Manual Therapy:         mins  74151;     Therapeutic Exercise:         mins  22385;     Neuromuscular Graeme:    30    mins  59810;    Therapeutic Activity:          mins  26077;     Gait Training:           mins  30143;     Ultrasound:          mins  59940;    Ionto                                   mins   88109  Self Care                            mins   66219  Canalith Repos                   mins  28905    Un-Timed:  Electrical Stimulation:         mins  08362 ( );  Dry Needling          mins self-pay  Traction          mins 20786  Low Eval          Mins  02092  Mod Eval          Mins  06896  High Eval                            Mins  19703  Re-Eval                               mins  08506    Timed Treatment:   30   mins   Total Treatment:     30   mins    Destiney Faulkner Physical Therapist Student

## 2022-05-20 ENCOUNTER — TREATMENT (OUTPATIENT)
Dept: PHYSICAL THERAPY | Facility: CLINIC | Age: 57
End: 2022-05-20

## 2022-05-20 DIAGNOSIS — R26.89 IMBALANCE: ICD-10-CM

## 2022-05-20 DIAGNOSIS — R42 DIZZINESS: Primary | ICD-10-CM

## 2022-05-20 DIAGNOSIS — R26.9 GAIT DISTURBANCE: ICD-10-CM

## 2022-05-20 PROCEDURE — 97112 NEUROMUSCULAR REEDUCATION: CPT | Performed by: PHYSICAL THERAPIST

## 2022-05-20 NOTE — PROGRESS NOTES
Re-Assessment / Re-Certification        Patient: Chai Gaston   : 1965  Diagnosis/ICD-10 Code:  Dizziness [R42]  Referring practitioner: MARCO Campuzano  Date of Initial Visit: Type: THERAPY  Noted: 3/22/2022  Today's Date: 2022  Patient seen for 12 sessions      Subjective:   Chai Gaston reports: that he feels like he has improved in the past month. Pt reports getting up in the morning has been easier, can stand longer without having to hold on to something, and has less flickering of his eyes. Pt continues to have significant dizziness while driving. Pt reports wanting to continue with PT to improve his balance and decrease dizziness.     Subjective Questionnaire: DHI: 62%  Clinical Progress: improved  Home Program Compliance: Yes  Treatment has included: therapeutic exercise, neuromuscular re-education, manual therapy and therapeutic activity    Subjective   Objective      Smooth pursuits horizontal: WNL  Smooth pursuits vertical: WNL  Saccades horizontal: slight dizziness, WNL  Saccades vertical: slight dizziness, WNL  VOR horizontal: slight dizziness, WNL  VOR vertical: slight dizziness, WNL  Near point convergence: NT     Romberg on floor: 30 seconds  Romberg on floor/eyes closed: 30 seconds  Romberg on foam: 30 seconds (18 seconds last time)  Romberg on foam/eyes closed: 4 seconds  Sharpened romberg on floor: 30 seconds (8 seconds last time)  Sharpened romberg on floor/eyes closed: 5  Sharpened romberg on foam: 10 seconds  Sharpened romberg on foam/eyes closed: not tested    Assessment/Plan   Pt had decreased dizziness and improved speed with visual testings. Pt had improvements in balance testing since last progress note. Pt had decrease in DHI indicating improved tolerance to functional activities. Pt continues to have significant dizziness and loss of balance with dynamic balance exercises, standing on an unstable surface, and with eyes closed. Pt continues to have abnormal  balance strategies including using exaggerated arm movements. Pt will continue to benefit from advancing to more dynamic balance exercises to improve his tolerance to functional activities.   Goals  Plan Goals: 1. Mobility: Walking/Moving Around Functional Limitation                       LTG 1: 12 weeks:  The patient will demonstrate 40% limitation by achieving a score of 40 on the Dizziness Handicap Inventory.   STATUS:  progressing  STG 1a: 6 weeks:  The patient will demonstrate 50% limitation by achieving a score of 50 on the Dizziness Handicap Inventory.     STATUS:  progressing      2. The patient has difficulty with balance.   LTG 2: 12 weeks: The patient will hold the sharpened romberg position on floor with eyes closed for 30 seconds in order to improve balance and decrease risk of falling.   STATUS: progressing   STG 2: 6 weeks: The patient will hold the romberg position on foam with eyes closed for 30 seconds in order to improve balance and decrease risk of falling.   STATUS: progressing     3. The patient has dizziness.   LTG 3: 12 weeks: The patient will report a 50% improvement in dizziness in order to improve tolerance to functional activities.  STATUS: progressing   STG 2: 6 weeks: The patient will report a 25% improvement in dizziness in order to improve tolerance to functional activities.  STATUS: met      Progress toward previous goals: Partially Met    See Exercise, Manual, and Modality Logs for complete treatment.       PT Signature: Electronically signed by Destiney Faulkner Physical Therapist Student      Certification Period:  5/20/2022 thru 8/19/22      Based upon review of the patient's progress and continued therapy plan, it is my medical opinion that Chai Gaston should continue physical therapy treatment at [facility]  [address] [place] [id number] [phone].    Signature: __________________________________  Tatum Cruz  MARCO      PHYSICIAN: Tatum Cruz APRN  NPI: [id number]                                      DATE:        Timed:         Manual Therapy:         mins  60619;     Therapeutic Exercise:         mins  44162;     Neuromuscular Graeme:    28    mins  43756;    Therapeutic Activity:          mins  73368;     Gait Training:           mins  85864;     Ultrasound:          mins  59259;    Ionto                                   mins   68297  Self Care                            mins   62854  Aquatic                               mins 69286      Un-Timed:  Electrical Stimulation:         mins  94538 ( );  Dry Needling          mins self-pay  Traction          mins 61285  Low Eval          Mins  62227  Mod Eval          Mins  60432  High Eval                            Mins  52437  Re-Eval                               mins  06796      Timed Treatment:   28   mins   Total Treatment:     28   mins

## 2022-05-24 ENCOUNTER — TREATMENT (OUTPATIENT)
Dept: PHYSICAL THERAPY | Facility: CLINIC | Age: 57
End: 2022-05-24

## 2022-05-24 DIAGNOSIS — R26.9 GAIT DISTURBANCE: ICD-10-CM

## 2022-05-24 DIAGNOSIS — R26.89 IMBALANCE: ICD-10-CM

## 2022-05-24 DIAGNOSIS — R42 DIZZINESS: Primary | ICD-10-CM

## 2022-05-24 PROCEDURE — 97112 NEUROMUSCULAR REEDUCATION: CPT | Performed by: PHYSICAL THERAPIST

## 2022-05-24 NOTE — PROGRESS NOTES
Physical Therapy Daily Progress Note    VISIT#: 13    Subjective   Chai Gaston reports feeling okay today. Pt reports still have some dizziness.       Objective     See Exercise, Manual, and Modality Logs for complete treatment.       Assessment/Plan  Pt had significant difficulty with sharpened romberg on foam. Pt was educated on the importance of using some stepping strategies. Pt was able regain balance with more control when using stepping strategies. Pt will continue to benefit from balance exercises to decrease risk of falling.     Progress per Plan of Care and Progress strengthening /stabilization /functional activity            Timed:         Manual Therapy:         mins  21806;     Therapeutic Exercise:         mins  36775;     Neuromuscular Graeme:    29    mins  60698;    Therapeutic Activity:          mins  00840;     Gait Training:           mins  92505;     Ultrasound:          mins  31596;    Ionto                                   mins   88261  Self Care                            mins   02014  Canalith Repos                  mins  07601    Un-Timed:  Electrical Stimulation:         mins  22517 ( );  Dry Needling          mins self-pay  Traction          mins 13608  Low Eval          Mins  59822  Mod Eval          Mins  35331  High Eval                            Mins  48885  Re-Eval                               mins  28099    Timed Treatment:   29   mins   Total Treatment:     29   mins    Destiney Faulkner Physical Therapist Student

## 2022-05-31 ENCOUNTER — TREATMENT (OUTPATIENT)
Dept: PHYSICAL THERAPY | Facility: CLINIC | Age: 57
End: 2022-05-31

## 2022-05-31 DIAGNOSIS — R42 DIZZINESS: Primary | ICD-10-CM

## 2022-05-31 DIAGNOSIS — R26.89 IMBALANCE: ICD-10-CM

## 2022-05-31 DIAGNOSIS — R26.9 GAIT DISTURBANCE: ICD-10-CM

## 2022-05-31 PROCEDURE — 97112 NEUROMUSCULAR REEDUCATION: CPT | Performed by: PHYSICAL THERAPIST

## 2022-05-31 NOTE — PROGRESS NOTES
Physical Therapy Daily Progress Note    VISIT#: 14    Subjective   Chai Gaston reports feeling okay today.     Objective     See Exercise, Manual, and Modality Logs for complete treatment.       Assessment/Plan  Pt was able to hold sharpened romberg on foam with less loss of balance. Some of pt's balance exercises were advanced with significant dizziness and nausea. Pt will continue to benefit from habituation exercises to improve tolerance to functional activities.     Progress per Plan of Care and Progress strengthening /stabilization /functional activity            Timed:         Manual Therapy:         mins  59643;     Therapeutic Exercise:         mins  82585;     Neuromuscular Graeme:    28    mins  18964;    Therapeutic Activity:          mins  85548;     Gait Training:           mins  60632;     Ultrasound:          mins  20001;    Ionto                                   mins   73709  Self Care                            mins   38932  Canalith Repos                   mins  60904    Un-Timed:  Electrical Stimulation:         mins  79492 ( );  Dry Needling          mins self-pay  Traction          mins 60697  Low Eval          Mins  82166  Mod Eval          Mins  58093  High Eval                            Mins  86167  Re-Eval                               mins  68208    Timed Treatment:   28   mins   Total Treatment:     28   mins    Destiney Faulkner Physical Therapist Student

## 2022-06-06 ENCOUNTER — TREATMENT (OUTPATIENT)
Dept: PHYSICAL THERAPY | Facility: CLINIC | Age: 57
End: 2022-06-06

## 2022-06-06 DIAGNOSIS — R42 DIZZINESS: Primary | ICD-10-CM

## 2022-06-06 DIAGNOSIS — R26.89 IMBALANCE: ICD-10-CM

## 2022-06-06 DIAGNOSIS — R26.9 GAIT DISTURBANCE: ICD-10-CM

## 2022-06-06 PROCEDURE — 97112 NEUROMUSCULAR REEDUCATION: CPT | Performed by: PHYSICAL THERAPIST

## 2022-06-06 NOTE — PROGRESS NOTES
Physical Therapy Daily Progress Note    VISIT#: 15    Subjective   Chai Gaston reports he is nauseous today.  He states he returned to doctor and they told him his lymph nodes were swollen.     Objective     See Exercise, Manual, and Modality Logs for complete treatment.     Assessment/Plan  Pt shows improvements in his balance today but does require cuing to control trunk while in various positions during exercises.  He shows improvements in his ability to incorporate stepping strategy during balance exercises.  He requires continued PT to improve balance and dizziness.      Progress per Plan of Care and Progress strengthening /stabilization /functional activity            Timed:         Manual Therapy:         mins  21158;     Therapeutic Exercise:         mins  36875;     Neuromuscular Graeme:    26    mins  21469;    Therapeutic Activity:          mins  96001;     Gait Training:           mins  86825;     Ultrasound:          mins  27483;    Ionto                                   mins   43909  Self Care                            mins   30084  Canalith Repos                   mins  47736    Un-Timed:  Electrical Stimulation:         mins  97489 ( );  Dry Needling          mins self-pay  Traction          mins 97205  Low Eval          Mins  53227  Mod Eval          Mins  57869  High Eval                            Mins  71838  Re-Eval                               mins  42165    Timed Treatment:   26   mins   Total Treatment:     26   mins    Sandra Boone PT, DPT  License Number [id number]

## 2022-09-26 ENCOUNTER — DOCUMENTATION (OUTPATIENT)
Dept: PHYSICAL THERAPY | Facility: CLINIC | Age: 57
End: 2022-09-26

## 2022-09-26 NOTE — PROGRESS NOTES
Discharge Summary  Discharge Summary from Physical Therapy Report    Patient Information  Chai Gaston  1965     Pt attended therapy for dizziness/imbalance and was making some progress towards goals but still had evidence of imbalance.  He cancelled his last few appointments and did not schedule more.  He is being discharged from PT.      Goals  Plan Goals: 1. Mobility: Walking/Moving Around Functional Limitation                       LTG 1: 12 weeks:  The patient will demonstrate 40% limitation by achieving a score of 40 on the Dizziness Handicap Inventory.   STATUS: not met  STG 1a: 6 weeks:  The patient will demonstrate 50% limitation by achieving a score of 50 on the Dizziness Handicap Inventory.     STATUS: not met     2. The patient has difficulty with balance.   LTG 2: 12 weeks: The patient will hold the sharpened romberg position on floor with eyes closed for 30 seconds in order to improve balance and decrease risk of falling.   STATUS: not met  STG 2: 6 weeks: The patient will hold the romberg position on foam with eyes closed for 30 seconds in order to improve balance and decrease risk of falling.   STATUS: not met     3. The patient has dizziness.   LTG 3: 12 weeks: The patient will report a 50% improvement in dizziness in order to improve tolerance to functional activities.  STATUS: not met  STG 2: 6 weeks: The patient will report a 25% improvement in dizziness in order to improve tolerance to functional activities.  STATUS: met       Sandra Boone, PT  Physical Therapist  [place] License: [id number]

## 2022-09-29 ENCOUNTER — TREATMENT (OUTPATIENT)
Dept: PHYSICAL THERAPY | Facility: CLINIC | Age: 57
End: 2022-09-29

## 2022-09-29 DIAGNOSIS — R26.9 GAIT DISTURBANCE: ICD-10-CM

## 2022-09-29 DIAGNOSIS — R42 DIZZINESS: Primary | ICD-10-CM

## 2022-09-29 DIAGNOSIS — R26.89 IMBALANCE: ICD-10-CM

## 2022-09-29 PROCEDURE — 97161 PT EVAL LOW COMPLEX 20 MIN: CPT | Performed by: PHYSICAL THERAPIST

## 2022-09-29 NOTE — PROGRESS NOTES
Physical Therapy Initial Evaluation and Plan of Care    Patient: Chai Gaston   : 1965  Diagnosis/ICD-10 Code:  Dizziness [R42]  Referring practitioner: MARCO Campuzano  Date of Initial Visit: 2022  Today's Date: 2022  Patient seen for 1 sessions           Subjective Questionnaire: DHI: 82% limited      Subjective Evaluation    History of Present Illness  Mechanism of injury: Pt has had a history of dizziness and was previously participating in vestibular rehab.  [facility] told [facility] it was a TBI that is causing his dizziness.  He got new glasses recently.  He reports difficulty typing and using his cellphone because his eyes can't focus on a specific letter.  Pt reports dizziness with looking up, looking sideways, looking at bright screens, looking while merging in traffic, maneuvering/driving in tight spaces, bending over, and rolling over in bed.  Pt reports difficulty stepping out of the bathtub.  He still trips and his right shoe is still striking the floor a lot compared to the left. He reports he almost fell recently but caught himself on the chair but still ended up on the ground.  Pt is taking medication for nausea and dizziness.  Pt denies spinning.  He states he has continued with his visual exercises at home.  Pt is still working full time in IT at [employer].        Medical history: MI, HTN, pre-diabetic, lumbar fusion, L hand surgery, hernia surgery and intestines removed    Treatments  Previous treatment: physical therapy           Objective          Ambulation     Observational Gait   Gait: antalgic     Additional Observational Gait Details  Significant loss of balance when ambulating with horizontal/vertical head turns  No loss of balance with 180 degree turn  Loss of balance with stepping over foam blocks and walking around cones    Functional Assessment     Comments  Smooth pursuits horizontal: difficulty performing, dizziness, nausea  Smooth pursuits  vertical:  difficulty performing, dizziness, nausea  Saccades horizontal:  difficulty performing, dizziness, nausea  Saccades vertical:  difficulty performing, dizziness, nausea  VOR horizontal:  difficulty performing, dizziness, nausea  VOR vertical:  difficulty performing, dizziness, nausea  Head thrust test: pt gets nauseous during testing; unable to keep eyes on nose due to pt closing eyes  Head shaking nystagmus test:     Romberg on floor: 30 seconds  Romberg on floor/eyes closed: 3 seconds  Romberg on foam: 9 seconds  Romberg on foam/eyes closed: 10 seconds  Sharpened romberg on floor: 15 seconds  Sharpened romberg on floor/eyes closed: 10 seconds  Sharpened romberg on foam: 16 seconds  Sharpened romberg on foam/eyes closed: 6 seconds  **pt uses abnormal balance strategies during testing**        See Exercise, Manual, and Modality Logs for complete treatment.       Assessment & Plan     Assessment  Impairments: abnormal gait, activity intolerance, impaired balance, lacks appropriate home exercise program and safety issue  Functional Limitations: walking, moving in bed and stooping  Assessment details: Pt presents with limitations, noted below, that impede his ability to walk with head turns, bend over, stand up, and perform functional activities.  The patient presents with a diagnosis of dizziness and has dizziness with gaze stabilization, impaired balance, and difficulty with functional activities and will benefit from therapeutic exercises, manual therapy, neuromuscular re-education, gait training, canalith repositioning maneuver, and modalities to improve tolerance to functional activities.  Pt uses abnormal balance strategies during balance testing.  He is unable to keep eyes open during head thrust test. The skills of a therapist will be required to safely and effectively implement the following treatment plan to restore maximal level of function.     Prognosis: good    Goals  Plan Goals: 1. Mobility:  Walking/Moving Around Functional Limitation     LTG 1: 12 weeks:  The patient will demonstrate 50% limitation by achieving a score of 50 on the Dizziness Handicap Inventory.   STATUS:  New   STG 1a: 6 weeks:  The patient will demonstrate 60% limitation by achieving a score of 60 on the Dizziness Handicap Inventory.     STATUS:  New      2. The patient has difficulty with balance.   LTG 2: 12 weeks: The patient will hold the romberg position on foam with eyes open for 30 seconds in order to improve balance and decrease risk of falling.   STATUS: New   STG 2: 6 weeks: The patient will hold the romberg position on floor with eyes closed for 15 seconds in order to improve balance and decrease risk of falling.   STATUS: New     TREATMENT:  Manual therapy, neuromuscular re-education, gait training, canalith repositioning maneuver, therapeutic exercise, home exercise instruction, and modalities as needed to include: moist heat, electrical stimulation, and ultrasound.             Plan  Therapy options: will be seen for skilled therapy services  Planned therapy interventions: balance/weight-bearing training, gait training, home exercise program, manual therapy, neuromuscular re-education, strengthening and therapeutic activities  Other planned therapy interventions: canalith repositioning maneuver  Frequency: 3x week  Duration in weeks: 12  Treatment plan discussed with: patient        History # of Personal Factors and/or Comorbidities: MODERATE (1-2)  Examination of Body System(s): # of elements: LOW (1-2)  Clinical Presentation: STABLE   Clinical Decision Making: LOW       Timed:         Manual Therapy:         mins  30580;     Therapeutic Exercise:         mins  15326;     Neuromuscular Graeme:        mins  65196;    Therapeutic Activity:          mins  10222;     Gait Training:           mins  04461;     Ultrasound:          mins  70254;    Ionto                                   mins   63577  Self Care                             mins   16796  Aquatic Therapy                 mins   24506      Un-Timed:  Electrical Stimulation:         mins  48553 ( );  Dry Needling          mins self-pay  Traction          mins 63927  Low Eval     20     Mins  76932  Mod Eval          Mins  20001  High Eval                            Mins  04833  Re-Eval                               mins  06076  Canalith Repos         mins 57346      Timed Treatment:   0   mins   Total Treatment:     20   mins    PT SIGNATURE: Electronically signed by HORTENSIA Jamil License: [id number]      Initial Certification  Certification Period: 9/29/2022 thru 12/27/2022  I certify that the therapy services are furnished while this patient is under my care.  The services outlined above are required by this patient, and will be reviewed every 90 days.     PHYSICIAN: Tatum Cruz APRN  NPI: [id number]                                      DATE:        Please sign and return via fax to [phone].Thank you, [facility].

## 2022-10-05 ENCOUNTER — TREATMENT (OUTPATIENT)
Dept: PHYSICAL THERAPY | Facility: CLINIC | Age: 57
End: 2022-10-05

## 2022-10-05 DIAGNOSIS — R26.9 GAIT DISTURBANCE: ICD-10-CM

## 2022-10-05 DIAGNOSIS — R26.89 IMBALANCE: ICD-10-CM

## 2022-10-05 DIAGNOSIS — R42 DIZZINESS: Primary | ICD-10-CM

## 2022-10-05 PROCEDURE — 97112 NEUROMUSCULAR REEDUCATION: CPT | Performed by: PHYSICAL THERAPIST

## 2022-10-05 NOTE — PROGRESS NOTES
Physical Therapy Daily Treatment Note    VISIT#: 2    Subjective   Chai Gaston reports he has continued with exercises at home.    Objective     See Exercise, Manual, and Modality Logs for complete treatment.     Assessment/Plan  Pt loses balance frequently throughout session but uses abnormal balance strategies (swinging arms, excessive trunk flexion) during exercises.  Although he is very unsteady during exercises, PT never has to assist for pt to regain his balance.   Will continue with balance, visual, and habituation exercises to improve tolerance to functional activities.    Progress per Plan of Care and Progress strengthening /stabilization /functional activity            Timed:         Manual Therapy:         mins  47386;     Therapeutic Exercise:         mins  21502;     Neuromuscular Graeme:    30    mins  44416;    Therapeutic Activity:          mins  54544;     Gait Training:           mins  02095;     Ultrasound:          mins  04757;    Ionto                                   mins   53949  Self Care                            mins   58171  Aquatic Therapy                 mins   97465    Un-Timed:  Electrical Stimulation:         mins  24427 ( );  Dry Needling          mins self-pay  Traction          mins 60043  Low Eval          Mins  76022  Mod Eval          Mins  68942  High Eval                            Mins  95153  Re-Eval                               mins  79876  Canalith Repos                   mins  14572    Timed Treatment:   30   mins   Total Treatment:     30   mins    Sandra Boone PT, DPT  License Number [id number]

## 2022-10-11 ENCOUNTER — TREATMENT (OUTPATIENT)
Dept: PHYSICAL THERAPY | Facility: CLINIC | Age: 57
End: 2022-10-11

## 2022-10-11 DIAGNOSIS — R26.9 GAIT DISTURBANCE: ICD-10-CM

## 2022-10-11 DIAGNOSIS — R26.89 IMBALANCE: ICD-10-CM

## 2022-10-11 DIAGNOSIS — R42 DIZZINESS: Primary | ICD-10-CM

## 2022-10-11 PROCEDURE — 97112 NEUROMUSCULAR REEDUCATION: CPT | Performed by: PHYSICAL THERAPIST

## 2022-10-11 NOTE — PROGRESS NOTES
Physical Therapy Daily Treatment Note    VISIT#: 3    Subjective   Chai Gaston reports his dizziness is mild this morning.    Objective     See Exercise, Manual, and Modality Logs for complete treatment.     Assessment/Plan  Pt continues to have frequent loss of balance that is self-corrected throughout session.  Will continue with balance and habituation exercises to improve tolerance to functional activities.    Progress per Plan of Care and Progress strengthening /stabilization /functional activity            Timed:         Manual Therapy:         mins  94496;     Therapeutic Exercise:         mins  25348;     Neuromuscular Graeme:    27    mins  73091;    Therapeutic Activity:          mins  42299;     Gait Training:           mins  07861;     Ultrasound:          mins  68955;    Ionto                                   mins   72050  Self Care                            mins   52197  Aquatic Therapy                 mins   17423    Un-Timed:  Electrical Stimulation:         mins  01840 ( );  Dry Needling          mins self-pay  Traction          mins 29449  Low Eval          Mins  48801  Mod Eval          Mins  94298  High Eval                            Mins  99907  Re-Eval                               mins  48028  Canalith Repos                   mins  55155    Timed Treatment:   27   mins   Total Treatment:     27   mins    Sandra Boone PT, DPT  License Number [id number]

## 2022-10-13 ENCOUNTER — TREATMENT (OUTPATIENT)
Dept: PHYSICAL THERAPY | Facility: CLINIC | Age: 57
End: 2022-10-13

## 2022-10-13 DIAGNOSIS — R26.89 IMBALANCE: ICD-10-CM

## 2022-10-13 DIAGNOSIS — R26.9 GAIT DISTURBANCE: ICD-10-CM

## 2022-10-13 DIAGNOSIS — R42 DIZZINESS: Primary | ICD-10-CM

## 2022-10-13 PROCEDURE — 97112 NEUROMUSCULAR REEDUCATION: CPT | Performed by: PHYSICAL THERAPIST

## 2022-10-13 NOTE — PROGRESS NOTES
Physical Therapy Daily Treatment Note  [place] [facility]: [address] [place] [address]    VISIT#: 4    Subjective   Chai Gaston reports he fell yesterday when he got up and started moving too quickly.    Objective     See Exercise, Manual, and Modality Logs for complete treatment.     Assessment/Plan  Pt continues to have abnormal balance strategies and experiences nausea throughout session.  Pt's balance is inconsistent and he never requires assistance from PT to regain balance.  Will continue with PT to improve balance and dizziness.    Progress per Plan of Care and Progress strengthening /stabilization /functional activity            Timed:         Manual Therapy:         mins  62317;     Therapeutic Exercise:         mins  37827;     Neuromuscular Graeme:    28    mins  15028;    Therapeutic Activity:          mins  33009;     Gait Training:           mins  80870;     Ultrasound:          mins  00537;    Ionto                                   mins   06061  Self Care                            mins   06816  Aquatic Therapy                 mins   75629    Un-Timed:  Electrical Stimulation:         mins  59712 ( );  Dry Needling          mins self-pay  Traction          mins 15845  Low Eval          Mins  16302  Mod Eval          Mins  47547  High Eval                            Mins  98716  Re-Eval                               mins  89962  Canalith Repos                   mins  86507    Timed Treatment:   28   mins   Total Treatment:     28   mins    Sandra Boone PT, DPT  License Number [id number]

## 2022-10-19 ENCOUNTER — TREATMENT (OUTPATIENT)
Dept: PHYSICAL THERAPY | Facility: CLINIC | Age: 57
End: 2022-10-19

## 2022-10-19 DIAGNOSIS — R42 DIZZINESS: Primary | ICD-10-CM

## 2022-10-19 DIAGNOSIS — R26.9 GAIT DISTURBANCE: ICD-10-CM

## 2022-10-19 DIAGNOSIS — R26.89 IMBALANCE: ICD-10-CM

## 2022-10-19 PROCEDURE — 97112 NEUROMUSCULAR REEDUCATION: CPT | Performed by: PHYSICAL THERAPIST

## 2022-10-19 NOTE — PROGRESS NOTES
Physical Therapy Daily Treatment Note  [facility]: [address] [place] [address]    VISIT#: 5    Subjective   Chai Gaston reports he fell on Monday while walking to the mailbox.      Objective     See Exercise, Manual, and Modality Logs for complete treatment.     Assessment/Plan  Balance exercises are able to be advanced however pt continues to demonstrate significant balance deficits and abnormal balance strategies.  He has the same balance deficit with sharpened romberg on foam with eyes closed that he does with standing on floor with eyes closed, which is abnormal.  Will continue with plan of care.    Progress per Plan of Care and Progress strengthening /stabilization /functional activity            Timed:         Manual Therapy:         mins  53908;     Therapeutic Exercise:         mins  79429;     Neuromuscular Graeme:    28    mins  12816;    Therapeutic Activity:          mins  01001;     Gait Training:           mins  05474;     Ultrasound:          mins  32176;    Ionto                                   mins   26196  Self Care                            mins   23211  Aquatic Therapy                 mins   82096    Un-Timed:  Electrical Stimulation:         mins  62967 ( );  Dry Needling          mins self-pay  Traction          mins 45900  Low Eval          Mins  77195  Mod Eval          Mins  35359  High Eval                            Mins  96880  Re-Eval                              mins  85490  Canalith Repos                   mins  34446    Timed Treatment:   28   mins   Total Treatment:     28   mins    Sandra Boone PT, DPT  License Number [id number]

## 2022-10-21 ENCOUNTER — TREATMENT (OUTPATIENT)
Dept: PHYSICAL THERAPY | Facility: CLINIC | Age: 57
End: 2022-10-21

## 2022-10-21 DIAGNOSIS — R42 DIZZINESS: Primary | ICD-10-CM

## 2022-10-21 DIAGNOSIS — R26.89 IMBALANCE: ICD-10-CM

## 2022-10-21 DIAGNOSIS — R26.9 GAIT DISTURBANCE: ICD-10-CM

## 2022-10-21 PROCEDURE — 97112 NEUROMUSCULAR REEDUCATION: CPT | Performed by: PHYSICAL THERAPIST

## 2022-10-21 NOTE — PROGRESS NOTES
Physical Therapy Daily Treatment Note  [facility]: [address] [place] [address]    VISIT#: 6    Subjective   Chai Gaston reports he is tired today.  He states he took Meclizine before he came in today.     Objective     See Exercise, Manual, and Modality Logs for complete treatment.     Assessment/Plan  Pt continues to have significant balance deficit.  Exercises were not advanced today due to pt already being very unsteady.  Continue with plan of care.    Progress per Plan of Care and Progress strengthening /stabilization /functional activity            Timed:         Manual Therapy:         mins  84418;     Therapeutic Exercise:         mins  76294;     Neuromuscular Graeme:    27    mins  55431;    Therapeutic Activity:          mins  02031;     Gait Training:           mins  91207;     Ultrasound:          mins  27422;    Ionto                                   mins   51453  Self Care                            mins   40075  Aquatic Therapy                 mins   72534    Un-Timed:  Electrical Stimulation:         mins  76663 ( );  Dry Needling          mins self-pay  Traction          mins 53320  Low Eval          Mins  09101  Mod Eval          Mins  55342  High Eval                            Mins  24186  Re-Eval                               mins  33379  Canalith Repos                   mins  76221    Timed Treatment:   27   mins   Total Treatment:     27   mins    Sandra Boone PT, DPT  License Number [id number]

## 2022-10-24 ENCOUNTER — TREATMENT (OUTPATIENT)
Dept: PHYSICAL THERAPY | Facility: CLINIC | Age: 57
End: 2022-10-24

## 2022-10-24 DIAGNOSIS — R26.89 IMBALANCE: ICD-10-CM

## 2022-10-24 DIAGNOSIS — R42 DIZZINESS: Primary | ICD-10-CM

## 2022-10-24 DIAGNOSIS — R26.9 GAIT DISTURBANCE: ICD-10-CM

## 2022-10-24 PROCEDURE — 97112 NEUROMUSCULAR REEDUCATION: CPT | Performed by: PHYSICAL THERAPIST

## 2022-10-24 NOTE — PROGRESS NOTES
Physical Therapy Daily Treatment Note  [facility]: [address] [place] [address]    VISIT#: 7    Subjective   Chai Gaston reports he has been tripping a little bit.    Objective     See Exercise, Manual, and Modality Logs for complete treatment.     Assessment/Plan  Pt has less accessory trunk movement during balance exercises today.  He continues to have significant balance deficits and is nauseous by the end of the session. Will re-evaluate next session.    Progress per Plan of Care and Progress strengthening /stabilization /functional activity            Timed:         Manual Therapy:         mins  19727;     Therapeutic Exercise:         mins  07239;     Neuromuscular Graeme:    27    mins  47144;    Therapeutic Activity:          mins  02548;     Gait Training:           mins  31824;     Ultrasound:          mins  95899;    Ionto                                   mins   29639  Self Care                            mins   01709  Aquatic Therapy                 mins   65413    Un-Timed:  Electrical Stimulation:         mins  59312 ( );  Dry Needling          mins self-pay  Traction          mins 79869  Low Eval          Mins  11558  Mod Eval          Mins  32579  High Eval                            Mins  81377  Re-Eval                               mins  53689  Canalith Repos                   mins  98998    Timed Treatment:   27   mins   Total Treatment:     27   mins    Sandra Boone PT, DPT  License Number [id number]

## 2022-11-25 ENCOUNTER — APPOINTMENT (OUTPATIENT)
Dept: CT IMAGING | Facility: HOSPITAL | Age: 57
End: 2022-11-25

## 2022-11-25 ENCOUNTER — HOSPITAL ENCOUNTER (EMERGENCY)
Facility: HOSPITAL | Age: 57
Discharge: HOME | End: 2022-11-25
Attending: EMERGENCY MEDICINE | Admitting: EMERGENCY MEDICINE

## 2022-11-25 VITALS
WEIGHT: 252.87 LBS | HEIGHT: 72 IN | HEART RATE: 79 BPM | RESPIRATION RATE: 18 BRPM | OXYGEN SATURATION: 96 % | SYSTOLIC BLOOD PRESSURE: 140 MMHG | BODY MASS INDEX: 34.25 KG/M2 | TEMPERATURE: 97.6 F | DIASTOLIC BLOOD PRESSURE: 90 MMHG

## 2022-11-25 DIAGNOSIS — K59.00 CONSTIPATION, UNSPECIFIED CONSTIPATION TYPE: ICD-10-CM

## 2022-11-25 DIAGNOSIS — R10.9 ABDOMINAL PAIN, UNSPECIFIED ABDOMINAL LOCATION: Primary | ICD-10-CM

## 2022-11-25 LAB
ALBUMIN SERPL-MCNC: 4.2 G/DL (ref 3.5–5.2)
ALBUMIN/GLOB SERPL: 1.6 G/DL
ALP SERPL-CCNC: 51 U/L (ref 39–117)
ALT SERPL W P-5'-P-CCNC: 25 U/L (ref 1–41)
ANION GAP SERPL CALCULATED.3IONS-SCNC: 10 MMOL/L (ref 5–15)
AST SERPL-CCNC: 22 U/L (ref 1–40)
BASOPHILS # BLD AUTO: 0.05 10*3/MM3 (ref 0–0.2)
BASOPHILS NFR BLD AUTO: 0.9 % (ref 0–1.5)
BILIRUB SERPL-MCNC: 0.4 MG/DL (ref 0–1.2)
BUN SERPL-MCNC: 20 MG/DL (ref 6–20)
BUN/CREAT SERPL: 16.7 (ref 7–25)
CALCIUM SPEC-SCNC: 8.9 MG/DL (ref 8.6–10.5)
CHLORIDE SERPL-SCNC: 103 MMOL/L (ref 98–107)
CO2 SERPL-SCNC: 25 MMOL/L (ref 22–29)
CREAT SERPL-MCNC: 1.2 MG/DL (ref 0.76–1.27)
D-LACTATE SERPL-SCNC: 2.1 MMOL/L (ref 0.5–2)
DEPRECATED RDW RBC AUTO: 35.4 FL (ref 37–54)
EGFRCR SERPLBLD CKD-EPI 2021: 69.7 ML/MIN/1.73
EOSINOPHIL # BLD AUTO: 0.37 10*3/MM3 (ref 0–0.4)
EOSINOPHIL NFR BLD AUTO: 6.6 % (ref 0.3–6.2)
ERYTHROCYTE [DISTWIDTH] IN BLOOD BY AUTOMATED COUNT: 11.7 % (ref 12.3–15.4)
GLOBULIN UR ELPH-MCNC: 2.6 GM/DL
GLUCOSE SERPL-MCNC: 108 MG/DL (ref 65–99)
HCT VFR BLD AUTO: 45.5 % (ref 37.5–51)
HGB BLD-MCNC: 15.6 G/DL (ref 13–17.7)
HOLD SPECIMEN: NORMAL
HOLD SPECIMEN: NORMAL
IMM GRANULOCYTES # BLD AUTO: 0.05 10*3/MM3 (ref 0–0.05)
IMM GRANULOCYTES NFR BLD AUTO: 0.9 % (ref 0–0.5)
LIPASE SERPL-CCNC: 30 U/L (ref 13–60)
LYMPHOCYTES # BLD AUTO: 2.18 10*3/MM3 (ref 0.7–3.1)
LYMPHOCYTES NFR BLD AUTO: 38.7 % (ref 19.6–45.3)
MCH RBC QN AUTO: 29.1 PG (ref 26.6–33)
MCHC RBC AUTO-ENTMCNC: 34.3 G/DL (ref 31.5–35.7)
MCV RBC AUTO: 84.9 FL (ref 79–97)
MONOCYTES # BLD AUTO: 0.52 10*3/MM3 (ref 0.1–0.9)
MONOCYTES NFR BLD AUTO: 9.2 % (ref 5–12)
NEUTROPHILS NFR BLD AUTO: 2.46 10*3/MM3 (ref 1.7–7)
NEUTROPHILS NFR BLD AUTO: 43.7 % (ref 42.7–76)
NRBC BLD AUTO-RTO: 0 /100 WBC (ref 0–0.2)
PLATELET # BLD AUTO: 178 10*3/MM3 (ref 140–450)
PMV BLD AUTO: 8.9 FL (ref 6–12)
POTASSIUM SERPL-SCNC: 4.6 MMOL/L (ref 3.5–5.2)
PROT SERPL-MCNC: 6.8 G/DL (ref 6–8.5)
RBC # BLD AUTO: 5.36 10*6/MM3 (ref 4.14–5.8)
RBC MORPH BLD: NORMAL
SMALL PLATELETS BLD QL SMEAR: ADEQUATE
SODIUM SERPL-SCNC: 138 MMOL/L (ref 136–145)
WBC MORPH BLD: NORMAL
WBC NRBC COR # BLD: 5.63 10*3/MM3 (ref 3.4–10.8)
WHOLE BLOOD HOLD COAG: NORMAL
WHOLE BLOOD HOLD SPECIMEN: NORMAL

## 2022-11-25 PROCEDURE — 74177 CT ABD & PELVIS W/CONTRAST: CPT

## 2022-11-25 PROCEDURE — 83605 ASSAY OF LACTIC ACID: CPT | Performed by: EMERGENCY MEDICINE

## 2022-11-25 PROCEDURE — 96374 THER/PROPH/DIAG INJ IV PUSH: CPT

## 2022-11-25 PROCEDURE — 80053 COMPREHEN METABOLIC PANEL: CPT | Performed by: EMERGENCY MEDICINE

## 2022-11-25 PROCEDURE — 83690 ASSAY OF LIPASE: CPT | Performed by: EMERGENCY MEDICINE

## 2022-11-25 PROCEDURE — 25010000002 ONDANSETRON PER 1 MG: Performed by: EMERGENCY MEDICINE

## 2022-11-25 PROCEDURE — 99284 EMERGENCY DEPT VISIT MOD MDM: CPT

## 2022-11-25 PROCEDURE — 85025 COMPLETE CBC W/AUTO DIFF WBC: CPT | Performed by: EMERGENCY MEDICINE

## 2022-11-25 PROCEDURE — 96375 TX/PRO/DX INJ NEW DRUG ADDON: CPT

## 2022-11-25 PROCEDURE — 0 IOPAMIDOL PER 1 ML: Performed by: EMERGENCY MEDICINE

## 2022-11-25 PROCEDURE — 85007 BL SMEAR W/DIFF WBC COUNT: CPT | Performed by: EMERGENCY MEDICINE

## 2022-11-25 PROCEDURE — 25010000002 KETOROLAC TROMETHAMINE PER 15 MG: Performed by: EMERGENCY MEDICINE

## 2022-11-25 RX ORDER — KETOROLAC TROMETHAMINE 30 MG/ML
30 INJECTION, SOLUTION INTRAMUSCULAR; INTRAVENOUS ONCE
Status: COMPLETED | OUTPATIENT
Start: 2022-11-25 | End: 2022-11-25

## 2022-11-25 RX ORDER — SODIUM CHLORIDE 0.9 % (FLUSH) 0.9 %
10 SYRINGE (ML) INJECTION AS NEEDED
Status: DISCONTINUED | OUTPATIENT
Start: 2022-11-25 | End: 2022-11-25 | Stop reason: HOSPADM

## 2022-11-25 RX ORDER — ONDANSETRON 2 MG/ML
4 INJECTION INTRAMUSCULAR; INTRAVENOUS ONCE
Status: COMPLETED | OUTPATIENT
Start: 2022-11-25 | End: 2022-11-25

## 2022-11-25 RX ADMIN — IOPAMIDOL 100 ML: 755 INJECTION, SOLUTION INTRAVENOUS at 09:07

## 2022-11-25 RX ADMIN — SODIUM CHLORIDE 1000 ML: 9 INJECTION, SOLUTION INTRAVENOUS at 07:42

## 2022-11-25 RX ADMIN — KETOROLAC TROMETHAMINE 30 MG: 30 INJECTION, SOLUTION INTRAMUSCULAR; INTRAVENOUS at 07:42

## 2022-11-25 RX ADMIN — ONDANSETRON 4 MG: 2 INJECTION INTRAMUSCULAR; INTRAVENOUS at 07:42
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
## 2022-11-25 NOTE — DISCHARGE INSTRUCTIONS
Increase daily fluid intake.  Increase dietary fiber.  Take a daily stool softener such as MiraLAX or Colace.  Take the prescription of GoLytely today as directed.  Start this as soon as possible.  When she start having bowel movements I would expect some rectal bleeding.  Return to the ER for her severe abdominal pain, uncontrollable bleeding, intractable vomiting, or any other concerns issues that may arise.

## 2022-12-08 ENCOUNTER — HOSPITAL ENCOUNTER (INPATIENT)
Facility: HOSPITAL | Age: 57
LOS: 3 days | Discharge: HOME | End: 2022-12-11
Attending: EMERGENCY MEDICINE | Admitting: INTERNAL MEDICINE

## 2022-12-08 ENCOUNTER — APPOINTMENT (OUTPATIENT)
Dept: CT IMAGING | Facility: HOSPITAL | Age: 57
End: 2022-12-08

## 2022-12-08 DIAGNOSIS — K56.609 SMALL BOWEL OBSTRUCTION: Primary | ICD-10-CM

## 2022-12-08 LAB
ALBUMIN SERPL-MCNC: 4.3 G/DL (ref 3.5–5.2)
ALBUMIN/GLOB SERPL: 1.6 G/DL
ALP SERPL-CCNC: 52 U/L (ref 39–117)
ALT SERPL W P-5'-P-CCNC: 28 U/L (ref 1–41)
ANION GAP SERPL CALCULATED.3IONS-SCNC: 11.9 MMOL/L (ref 5–15)
AST SERPL-CCNC: 25 U/L (ref 1–40)
BASOPHILS # BLD AUTO: 0.03 10*3/MM3 (ref 0–0.2)
BASOPHILS NFR BLD AUTO: 0.2 % (ref 0–1.5)
BILIRUB SERPL-MCNC: 0.6 MG/DL (ref 0–1.2)
BUN SERPL-MCNC: 17 MG/DL (ref 6–20)
BUN/CREAT SERPL: 15 (ref 7–25)
CALCIUM SPEC-SCNC: 9.3 MG/DL (ref 8.6–10.5)
CHLORIDE SERPL-SCNC: 100 MMOL/L (ref 98–107)
CO2 SERPL-SCNC: 21.1 MMOL/L (ref 22–29)
CREAT SERPL-MCNC: 1.13 MG/DL (ref 0.76–1.27)
D-LACTATE SERPL-SCNC: 1.4 MMOL/L (ref 0.5–2)
D-LACTATE SERPL-SCNC: 2.2 MMOL/L (ref 0.5–2)
DEPRECATED RDW RBC AUTO: 35.7 FL (ref 37–54)
EGFRCR SERPLBLD CKD-EPI 2021: 74.9 ML/MIN/1.73
EOSINOPHIL # BLD AUTO: 0.04 10*3/MM3 (ref 0–0.4)
EOSINOPHIL NFR BLD AUTO: 0.3 % (ref 0.3–6.2)
ERYTHROCYTE [DISTWIDTH] IN BLOOD BY AUTOMATED COUNT: 11.9 % (ref 12.3–15.4)
GLOBULIN UR ELPH-MCNC: 2.7 GM/DL
GLUCOSE SERPL-MCNC: 112 MG/DL (ref 65–99)
HCT VFR BLD AUTO: 46.1 % (ref 37.5–51)
HGB BLD-MCNC: 15.8 G/DL (ref 13–17.7)
HOLD SPECIMEN: NORMAL
HOLD SPECIMEN: NORMAL
IMM GRANULOCYTES # BLD AUTO: 0.15 10*3/MM3 (ref 0–0.05)
IMM GRANULOCYTES NFR BLD AUTO: 1.1 % (ref 0–0.5)
LIPASE SERPL-CCNC: 27 U/L (ref 13–60)
LYMPHOCYTES # BLD AUTO: 1.52 10*3/MM3 (ref 0.7–3.1)
LYMPHOCYTES NFR BLD AUTO: 10.8 % (ref 19.6–45.3)
MCH RBC QN AUTO: 29 PG (ref 26.6–33)
MCHC RBC AUTO-ENTMCNC: 34.3 G/DL (ref 31.5–35.7)
MCV RBC AUTO: 84.6 FL (ref 79–97)
MONOCYTES # BLD AUTO: 1.19 10*3/MM3 (ref 0.1–0.9)
MONOCYTES NFR BLD AUTO: 8.5 % (ref 5–12)
NEUTROPHILS NFR BLD AUTO: 11.09 10*3/MM3 (ref 1.7–7)
NEUTROPHILS NFR BLD AUTO: 79.1 % (ref 42.7–76)
NRBC BLD AUTO-RTO: 0 /100 WBC (ref 0–0.2)
PLATELET # BLD AUTO: 209 10*3/MM3 (ref 140–450)
PMV BLD AUTO: 9 FL (ref 6–12)
POTASSIUM SERPL-SCNC: 4.4 MMOL/L (ref 3.5–5.2)
PROT SERPL-MCNC: 7 G/DL (ref 6–8.5)
RBC # BLD AUTO: 5.45 10*6/MM3 (ref 4.14–5.8)
SODIUM SERPL-SCNC: 133 MMOL/L (ref 136–145)
WBC NRBC COR # BLD: 14.02 10*3/MM3 (ref 3.4–10.8)
WHOLE BLOOD HOLD COAG: NORMAL
WHOLE BLOOD HOLD SPECIMEN: NORMAL

## 2022-12-08 PROCEDURE — 99285 EMERGENCY DEPT VISIT HI MDM: CPT

## 2022-12-08 PROCEDURE — 99223 1ST HOSP IP/OBS HIGH 75: CPT | Performed by: INTERNAL MEDICINE

## 2022-12-08 PROCEDURE — 80053 COMPREHEN METABOLIC PANEL: CPT | Performed by: EMERGENCY MEDICINE

## 2022-12-08 PROCEDURE — 85025 COMPLETE CBC W/AUTO DIFF WBC: CPT | Performed by: EMERGENCY MEDICINE

## 2022-12-08 PROCEDURE — 0 IOPAMIDOL PER 1 ML: Performed by: EMERGENCY MEDICINE

## 2022-12-08 PROCEDURE — 25010000002 CEFEPIME PER 500 MG: Performed by: EMERGENCY MEDICINE

## 2022-12-08 PROCEDURE — 25010000002 ONDANSETRON PER 1 MG: Performed by: EMERGENCY MEDICINE

## 2022-12-08 PROCEDURE — 83605 ASSAY OF LACTIC ACID: CPT | Performed by: EMERGENCY MEDICINE

## 2022-12-08 PROCEDURE — 87040 BLOOD CULTURE FOR BACTERIA: CPT | Performed by: EMERGENCY MEDICINE

## 2022-12-08 PROCEDURE — 25010000002 FENTANYL CITRATE (PF) 50 MCG/ML SOLUTION: Performed by: EMERGENCY MEDICINE

## 2022-12-08 PROCEDURE — 74177 CT ABD & PELVIS W/CONTRAST: CPT

## 2022-12-08 PROCEDURE — 83690 ASSAY OF LIPASE: CPT | Performed by: EMERGENCY MEDICINE

## 2022-12-08 PROCEDURE — 63710000001 ONDANSETRON ODT 4 MG TABLET DISPERSIBLE: Performed by: EMERGENCY MEDICINE

## 2022-12-08 RX ORDER — DIVALPROEX SODIUM 500 MG/1
1000 TABLET, EXTENDED RELEASE ORAL NIGHTLY
COMMUNITY

## 2022-12-08 RX ORDER — SODIUM CHLORIDE 0.9 % (FLUSH) 0.9 %
10 SYRINGE (ML) INJECTION AS NEEDED
Status: DISCONTINUED | OUTPATIENT
Start: 2022-12-08 | End: 2022-12-11 | Stop reason: HOSPADM

## 2022-12-08 RX ORDER — SODIUM CHLORIDE 0.9 % (FLUSH) 0.9 %
10 SYRINGE (ML) INJECTION EVERY 12 HOURS SCHEDULED
Status: DISCONTINUED | OUTPATIENT
Start: 2022-12-08 | End: 2022-12-11 | Stop reason: HOSPADM

## 2022-12-08 RX ORDER — NITROGLYCERIN 0.4 MG/1
0.4 TABLET SUBLINGUAL
Status: DISCONTINUED | OUTPATIENT
Start: 2022-12-08 | End: 2022-12-09

## 2022-12-08 RX ORDER — LOSARTAN POTASSIUM 25 MG/1
12.5 TABLET ORAL DAILY
COMMUNITY

## 2022-12-08 RX ORDER — ONDANSETRON 2 MG/ML
4 INJECTION INTRAMUSCULAR; INTRAVENOUS ONCE
Status: COMPLETED | OUTPATIENT
Start: 2022-12-08 | End: 2022-12-08

## 2022-12-08 RX ORDER — PANTOPRAZOLE SODIUM 40 MG/1
40 TABLET, DELAYED RELEASE ORAL DAILY
COMMUNITY

## 2022-12-08 RX ORDER — CEFEPIME 1 G/50ML
2 INJECTION, SOLUTION INTRAVENOUS ONCE
Status: COMPLETED | OUTPATIENT
Start: 2022-12-08 | End: 2022-12-08

## 2022-12-08 RX ORDER — SODIUM CHLORIDE 9 MG/ML
40 INJECTION, SOLUTION INTRAVENOUS AS NEEDED
Status: DISCONTINUED | OUTPATIENT
Start: 2022-12-08 | End: 2022-12-11 | Stop reason: HOSPADM

## 2022-12-08 RX ORDER — ONDANSETRON 4 MG/1
4 TABLET, ORALLY DISINTEGRATING ORAL ONCE
Status: COMPLETED | OUTPATIENT
Start: 2022-12-08 | End: 2022-12-08

## 2022-12-08 RX ORDER — SODIUM CHLORIDE, SODIUM LACTATE, POTASSIUM CHLORIDE, CALCIUM CHLORIDE 600; 310; 30; 20 MG/100ML; MG/100ML; MG/100ML; MG/100ML
100 INJECTION, SOLUTION INTRAVENOUS CONTINUOUS
Status: DISCONTINUED | OUTPATIENT
Start: 2022-12-08 | End: 2022-12-11

## 2022-12-08 RX ORDER — SODIUM PHOSPHATE, DIBASIC, ANHYDROUS, POTASSIUM PHOSPHATE, MONOBASIC, AND SODIUM PHOSPHATE, MONOBASIC, MONOHYDRATE 852; 155; 130 MG/1; MG/1; MG/1
1 TABLET, COATED ORAL 2 TIMES DAILY
COMMUNITY

## 2022-12-08 RX ORDER — ONDANSETRON 2 MG/ML
4 INJECTION INTRAMUSCULAR; INTRAVENOUS EVERY 6 HOURS PRN
Status: DISCONTINUED | OUTPATIENT
Start: 2022-12-08 | End: 2022-12-11 | Stop reason: HOSPADM

## 2022-12-08 RX ORDER — FENTANYL CITRATE 50 UG/ML
100 INJECTION, SOLUTION INTRAMUSCULAR; INTRAVENOUS
Status: DISCONTINUED | OUTPATIENT
Start: 2022-12-08 | End: 2022-12-08

## 2022-12-08 RX ADMIN — Medication 10 ML: at 22:51

## 2022-12-08 RX ADMIN — SODIUM CHLORIDE 1000 ML: 9 INJECTION, SOLUTION INTRAVENOUS at 16:22

## 2022-12-08 RX ADMIN — ONDANSETRON 4 MG: 2 INJECTION INTRAMUSCULAR; INTRAVENOUS at 16:26

## 2022-12-08 RX ADMIN — SODIUM CHLORIDE, POTASSIUM CHLORIDE, SODIUM LACTATE AND CALCIUM CHLORIDE 100 ML/HR: 600; 310; 30; 20 INJECTION, SOLUTION INTRAVENOUS at 22:51

## 2022-12-08 RX ADMIN — FENTANYL CITRATE 100 MCG: 50 INJECTION, SOLUTION INTRAMUSCULAR; INTRAVENOUS at 18:59

## 2022-12-08 RX ADMIN — IOPAMIDOL 100 ML: 755 INJECTION, SOLUTION INTRAVENOUS at 18:20

## 2022-12-08 RX ADMIN — FENTANYL CITRATE 100 MCG: 50 INJECTION, SOLUTION INTRAMUSCULAR; INTRAVENOUS at 16:26

## 2022-12-08 RX ADMIN — CEFEPIME 2 G: 1 INJECTION, SOLUTION INTRAVENOUS at 18:59

## 2022-12-08 RX ADMIN — ONDANSETRON 4 MG: 4 TABLET, ORALLY DISINTEGRATING ORAL at 16:26
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
## 2022-12-09 ENCOUNTER — APPOINTMENT (OUTPATIENT)
Dept: GENERAL RADIOLOGY | Facility: HOSPITAL | Age: 57
End: 2022-12-09

## 2022-12-09 PROBLEM — Z98.890 HISTORY OF ABDOMINAL SURGERY: Status: ACTIVE | Noted: 2022-12-09

## 2022-12-09 LAB
ALBUMIN SERPL-MCNC: 4.1 G/DL (ref 3.5–5.2)
ALBUMIN/GLOB SERPL: 1.6 G/DL
ALP SERPL-CCNC: 55 U/L (ref 39–117)
ALT SERPL W P-5'-P-CCNC: 24 U/L (ref 1–41)
ANION GAP SERPL CALCULATED.3IONS-SCNC: 9.5 MMOL/L (ref 5–15)
AST SERPL-CCNC: 23 U/L (ref 1–40)
BASOPHILS # BLD AUTO: 0.02 10*3/MM3 (ref 0–0.2)
BASOPHILS NFR BLD AUTO: 0.3 % (ref 0–1.5)
BILIRUB SERPL-MCNC: 0.6 MG/DL (ref 0–1.2)
BUN SERPL-MCNC: 17 MG/DL (ref 6–20)
BUN/CREAT SERPL: 14.4 (ref 7–25)
CALCIUM SPEC-SCNC: 9.1 MG/DL (ref 8.6–10.5)
CHLORIDE SERPL-SCNC: 104 MMOL/L (ref 98–107)
CO2 SERPL-SCNC: 22.5 MMOL/L (ref 22–29)
CREAT SERPL-MCNC: 1.18 MG/DL (ref 0.76–1.27)
DEPRECATED RDW RBC AUTO: 38.5 FL (ref 37–54)
EGFRCR SERPLBLD CKD-EPI 2021: 71.1 ML/MIN/1.73
EOSINOPHIL # BLD AUTO: 0.07 10*3/MM3 (ref 0–0.4)
EOSINOPHIL NFR BLD AUTO: 1 % (ref 0.3–6.2)
ERYTHROCYTE [DISTWIDTH] IN BLOOD BY AUTOMATED COUNT: 12.1 % (ref 12.3–15.4)
GLOBULIN UR ELPH-MCNC: 2.6 GM/DL
GLUCOSE SERPL-MCNC: 92 MG/DL (ref 65–99)
HCT VFR BLD AUTO: 46.4 % (ref 37.5–51)
HGB BLD-MCNC: 15.5 G/DL (ref 13–17.7)
IMM GRANULOCYTES # BLD AUTO: 0.02 10*3/MM3 (ref 0–0.05)
IMM GRANULOCYTES NFR BLD AUTO: 0.3 % (ref 0–0.5)
LYMPHOCYTES # BLD AUTO: 1.61 10*3/MM3 (ref 0.7–3.1)
LYMPHOCYTES NFR BLD AUTO: 23.2 % (ref 19.6–45.3)
MCH RBC QN AUTO: 29.4 PG (ref 26.6–33)
MCHC RBC AUTO-ENTMCNC: 33.4 G/DL (ref 31.5–35.7)
MCV RBC AUTO: 88 FL (ref 79–97)
MONOCYTES # BLD AUTO: 0.68 10*3/MM3 (ref 0.1–0.9)
MONOCYTES NFR BLD AUTO: 9.8 % (ref 5–12)
NEUTROPHILS NFR BLD AUTO: 4.54 10*3/MM3 (ref 1.7–7)
NEUTROPHILS NFR BLD AUTO: 65.4 % (ref 42.7–76)
NRBC BLD AUTO-RTO: 0 /100 WBC (ref 0–0.2)
PLATELET # BLD AUTO: 184 10*3/MM3 (ref 140–450)
PMV BLD AUTO: 9.2 FL (ref 6–12)
POTASSIUM SERPL-SCNC: 4.7 MMOL/L (ref 3.5–5.2)
PROT SERPL-MCNC: 6.7 G/DL (ref 6–8.5)
RBC # BLD AUTO: 5.27 10*6/MM3 (ref 4.14–5.8)
RBC MORPH BLD: NORMAL
SMALL PLATELETS BLD QL SMEAR: NORMAL
SODIUM SERPL-SCNC: 136 MMOL/L (ref 136–145)
WBC MORPH BLD: NORMAL
WBC NRBC COR # BLD: 6.94 10*3/MM3 (ref 3.4–10.8)

## 2022-12-09 PROCEDURE — 74018 RADEX ABDOMEN 1 VIEW: CPT

## 2022-12-09 PROCEDURE — 99233 SBSQ HOSP IP/OBS HIGH 50: CPT | Performed by: INTERNAL MEDICINE

## 2022-12-09 PROCEDURE — 99221 1ST HOSP IP/OBS SF/LOW 40: CPT | Performed by: SURGERY

## 2022-12-09 PROCEDURE — 36415 COLL VENOUS BLD VENIPUNCTURE: CPT | Performed by: INTERNAL MEDICINE

## 2022-12-09 PROCEDURE — 85007 BL SMEAR W/DIFF WBC COUNT: CPT | Performed by: INTERNAL MEDICINE

## 2022-12-09 PROCEDURE — 25010000002 ENOXAPARIN PER 10 MG: Performed by: INTERNAL MEDICINE

## 2022-12-09 PROCEDURE — 25010000002 KETOROLAC TROMETHAMINE PER 15 MG: Performed by: INTERNAL MEDICINE

## 2022-12-09 PROCEDURE — 80053 COMPREHEN METABOLIC PANEL: CPT | Performed by: INTERNAL MEDICINE

## 2022-12-09 PROCEDURE — 85025 COMPLETE CBC W/AUTO DIFF WBC: CPT | Performed by: INTERNAL MEDICINE

## 2022-12-09 RX ORDER — FAMOTIDINE 10 MG/ML
20 INJECTION, SOLUTION INTRAVENOUS EVERY 12 HOURS SCHEDULED
Status: DISCONTINUED | OUTPATIENT
Start: 2022-12-09 | End: 2022-12-11 | Stop reason: HOSPADM

## 2022-12-09 RX ORDER — KETOROLAC TROMETHAMINE 15 MG/ML
15 INJECTION, SOLUTION INTRAMUSCULAR; INTRAVENOUS EVERY 6 HOURS PRN
Status: DISPENSED | OUTPATIENT
Start: 2022-12-09 | End: 2022-12-10

## 2022-12-09 RX ORDER — ENOXAPARIN SODIUM 100 MG/ML
40 INJECTION SUBCUTANEOUS EVERY 24 HOURS
Status: DISCONTINUED | OUTPATIENT
Start: 2022-12-09 | End: 2022-12-11 | Stop reason: HOSPADM

## 2022-12-09 RX ORDER — MEPERIDINE HYDROCHLORIDE 25 MG/ML
25 INJECTION INTRAMUSCULAR; INTRAVENOUS; SUBCUTANEOUS EVERY 4 HOURS PRN
Status: DISCONTINUED | OUTPATIENT
Start: 2022-12-09 | End: 2022-12-11 | Stop reason: HOSPADM

## 2022-12-09 RX ADMIN — KETOROLAC TROMETHAMINE 15 MG: 15 INJECTION, SOLUTION INTRAMUSCULAR; INTRAVENOUS at 01:07

## 2022-12-09 RX ADMIN — SODIUM CHLORIDE, POTASSIUM CHLORIDE, SODIUM LACTATE AND CALCIUM CHLORIDE 100 ML/HR: 600; 310; 30; 20 INJECTION, SOLUTION INTRAVENOUS at 20:11

## 2022-12-09 RX ADMIN — ENOXAPARIN SODIUM 40 MG: 100 INJECTION SUBCUTANEOUS at 12:46

## 2022-12-09 RX ADMIN — SODIUM CHLORIDE, POTASSIUM CHLORIDE, SODIUM LACTATE AND CALCIUM CHLORIDE 100 ML/HR: 600; 310; 30; 20 INJECTION, SOLUTION INTRAVENOUS at 09:29

## 2022-12-09 RX ADMIN — FAMOTIDINE 20 MG: 10 INJECTION INTRAVENOUS at 22:12

## 2022-12-09 RX ADMIN — Medication 10 ML: at 22:12

## 2022-12-09 RX ADMIN — FAMOTIDINE 20 MG: 10 INJECTION INTRAVENOUS at 12:47

## 2022-12-09 NOTE — PLAN OF CARE
Goal Outcome Evaluation:         VSS. Pt up in marquez and ambulated in room independently. Complaints of sneezing, NG still at 63. Pt states he is passing gas but no BM yet.No request for pain medication this shift. Minimal output from NG tube. Sandee Walls RN.

## 2022-12-09 NOTE — SIGNIFICANT NOTE
12/09/22 1032   Plan   Plan Met with patient and spouse, Jocelyn, at bedside.  NG tube in place.  Unsure if surgery is needed at this time.  Patient works and provides own IADL's.  Facesheet verified.  Plans to return home with no needs at this time.
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
## 2022-12-09 NOTE — PROGRESS NOTES
[facility]   Hospitalist Progress Note  Date: 2022  Patient Name: Chai Gaston  : 1965  MRN: [id number]  Date of admission: 2022      Subjective   Subjective     Chief Complaint: Abdominal pain    Summary:   Chai Gaston is a 57 y.o. male past medical history of Crohn's disease, partial colectomy and previous SBO that presents to the emergency department for evaluation of abdominal pain x1 week that he got acutely worse over the past 2 days.  He describes it as stabbing, diffuse, does not radiate, constant, no known aggravating relieving factors associated with nausea.  He denies any fevers, chills and sweats, vomiting, chest pain or shortness of breath, palpitations, diarrhea, dysuria, weakness, rash.  Work-up in the emergency department consistent with small bowel obstruction.  Surgery has been called and patient has an NG tube placed to suction at time of my evaluation.  Patient be admitted for ongoing monitoring and management and further surgical evaluation.     Interval Followup:   Vital signs stable on room air.  No nausea or vomiting.  Abdominal pain tolerable.  Has been passing gas but no BM yet.  NG to suction with only 120 mL output.    Review of Systems   All systems were reviewed and negative except for: Summary interval follow-up    Objective   Objective     Vitals:   Temp:  [97.8 °F (36.6 °C)-98.4 °F (36.9 °C)] 98 °F (36.7 °C)  Heart Rate:  [] 81  Resp:  [18-20] 20  BP: (113-153)/(56-93) 153/89  Physical Exam       Constitutional: Awake, alert, no acute distress              Eyes: Pupils equal, sclerae anicteric, no conjunctival injection              HENT: NCAT, mucous membranes moist              Neck: Supple, no thyromegaly, no lymphadenopathy, trachea midline              Respiratory: Clear to auscultation bilaterally, nonlabored respirations               Cardiovascular: RRR, no murmurs, rubs, or gallops, palpable pedal pulses bilaterally               Gastrointestinal: Positive bowel sounds, soft, diffusely tender to deep palpation, nondistended              Musculoskeletal: No bilateral ankle edema, no clubbing or cyanosis to extremities              Psychiatric: Appropriate affect, cooperative              Neurologic: Oriented x 3, strength symmetric in all extremities, Cranial Nerves grossly intact to confrontation, speech clear              Skin: No rashes        Result Review    Result Review:  I have personally reviewed the results from the time of this admission to 12/9/2022 17:47 EST and agree with these findings:  [x]  Laboratory  []  Microbiology  [x]  Radiology  []  EKG/Telemetry   []  Cardiology/Vascular   []  Pathology  [x]  Old records  [x]  Other: Medications    Assessment & Plan   Assessment / Plan     Assessment:  Small bowel obstruction.  Recurrent.  Crohn's disease in remission on mesalamine.  Leukocytosis.  PTSD.  Hyperlipidemia     Plan:  Continue IV fluid  Appreciate general surgery input.  Continue NG as per general surgery.  NPO./Diet as per general surgery.  Will check inflammatory markers including CRP and ESR.  Doubt Crohn's flare.  Hold IV steroids  IV Toradol and Demerol.  Patient allergic to multiple pain medications.  IV Pepcid.  DC telemetry  Follow-up abdominal x-ray.    Discussed plan with RN and .  Discharge home when cleared by general surgery    DVT prophylaxis:  Medical and mechanical DVT prophylaxis orders are present.    CODE STATUS:   Code Status (Patient has no pulse and is not breathing): CPR (Attempt to Resuscitate)  Medical Interventions (Patient has pulse or is breathing): Full Support      Part of this note may be an electronic transcription/translation of spoken language to printed text using the Dragon Dictation System.     Electronically signed by Dajuan Houston MD, 12/09/22, 5:47 PM EST.
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
## 2022-12-10 ENCOUNTER — APPOINTMENT (OUTPATIENT)
Dept: GENERAL RADIOLOGY | Facility: HOSPITAL | Age: 57
End: 2022-12-10

## 2022-12-10 LAB
ANION GAP SERPL CALCULATED.3IONS-SCNC: 14.5 MMOL/L (ref 5–15)
BASOPHILS # BLD AUTO: 0.03 10*3/MM3 (ref 0–0.2)
BASOPHILS NFR BLD AUTO: 0.5 % (ref 0–1.5)
BUN SERPL-MCNC: 18 MG/DL (ref 6–20)
BUN/CREAT SERPL: 15.8 (ref 7–25)
CALCIUM SPEC-SCNC: 9.2 MG/DL (ref 8.6–10.5)
CHLORIDE SERPL-SCNC: 101 MMOL/L (ref 98–107)
CO2 SERPL-SCNC: 21.5 MMOL/L (ref 22–29)
CREAT SERPL-MCNC: 1.14 MG/DL (ref 0.76–1.27)
CRP SERPL-MCNC: 1.65 MG/DL (ref 0–0.5)
DEPRECATED RDW RBC AUTO: 35.8 FL (ref 37–54)
EGFRCR SERPLBLD CKD-EPI 2021: 74.1 ML/MIN/1.73
EOSINOPHIL # BLD AUTO: 0.19 10*3/MM3 (ref 0–0.4)
EOSINOPHIL NFR BLD AUTO: 2.9 % (ref 0.3–6.2)
ERYTHROCYTE [DISTWIDTH] IN BLOOD BY AUTOMATED COUNT: 11.7 % (ref 12.3–15.4)
ERYTHROCYTE [SEDIMENTATION RATE] IN BLOOD: 10 MM/HR (ref 0–20)
GLUCOSE SERPL-MCNC: 69 MG/DL (ref 65–99)
HCT VFR BLD AUTO: 44.2 % (ref 37.5–51)
HGB BLD-MCNC: 15.2 G/DL (ref 13–17.7)
IMM GRANULOCYTES # BLD AUTO: 0.01 10*3/MM3 (ref 0–0.05)
IMM GRANULOCYTES NFR BLD AUTO: 0.2 % (ref 0–0.5)
LYMPHOCYTES # BLD AUTO: 2.05 10*3/MM3 (ref 0.7–3.1)
LYMPHOCYTES NFR BLD AUTO: 31.6 % (ref 19.6–45.3)
MAGNESIUM SERPL-MCNC: 2 MG/DL (ref 1.6–2.6)
MCH RBC QN AUTO: 29.2 PG (ref 26.6–33)
MCHC RBC AUTO-ENTMCNC: 34.4 G/DL (ref 31.5–35.7)
MCV RBC AUTO: 84.8 FL (ref 79–97)
MONOCYTES # BLD AUTO: 0.61 10*3/MM3 (ref 0.1–0.9)
MONOCYTES NFR BLD AUTO: 9.4 % (ref 5–12)
NEUTROPHILS NFR BLD AUTO: 3.59 10*3/MM3 (ref 1.7–7)
NEUTROPHILS NFR BLD AUTO: 55.4 % (ref 42.7–76)
NRBC BLD AUTO-RTO: 0 /100 WBC (ref 0–0.2)
PHOSPHATE SERPL-MCNC: 3.1 MG/DL (ref 2.5–4.5)
PLATELET # BLD AUTO: 215 10*3/MM3 (ref 140–450)
PMV BLD AUTO: 9.5 FL (ref 6–12)
POTASSIUM SERPL-SCNC: 4.4 MMOL/L (ref 3.5–5.2)
RBC # BLD AUTO: 5.21 10*6/MM3 (ref 4.14–5.8)
SODIUM SERPL-SCNC: 137 MMOL/L (ref 136–145)
WBC NRBC COR # BLD: 6.48 10*3/MM3 (ref 3.4–10.8)

## 2022-12-10 PROCEDURE — 25010000002 KETOROLAC TROMETHAMINE PER 15 MG: Performed by: INTERNAL MEDICINE

## 2022-12-10 PROCEDURE — 85025 COMPLETE CBC W/AUTO DIFF WBC: CPT | Performed by: INTERNAL MEDICINE

## 2022-12-10 PROCEDURE — 80048 BASIC METABOLIC PNL TOTAL CA: CPT | Performed by: SURGERY

## 2022-12-10 PROCEDURE — 99232 SBSQ HOSP IP/OBS MODERATE 35: CPT | Performed by: INTERNAL MEDICINE

## 2022-12-10 PROCEDURE — 25010000002 ONDANSETRON PER 1 MG: Performed by: INTERNAL MEDICINE

## 2022-12-10 PROCEDURE — 84100 ASSAY OF PHOSPHORUS: CPT | Performed by: SURGERY

## 2022-12-10 PROCEDURE — 99231 SBSQ HOSP IP/OBS SF/LOW 25: CPT | Performed by: SURGERY

## 2022-12-10 PROCEDURE — 74019 RADEX ABDOMEN 2 VIEWS: CPT

## 2022-12-10 PROCEDURE — 83735 ASSAY OF MAGNESIUM: CPT | Performed by: SURGERY

## 2022-12-10 PROCEDURE — 85652 RBC SED RATE AUTOMATED: CPT | Performed by: INTERNAL MEDICINE

## 2022-12-10 PROCEDURE — 25010000002 ENOXAPARIN PER 10 MG: Performed by: INTERNAL MEDICINE

## 2022-12-10 PROCEDURE — 0 DIATRIZOATE MEGLUMINE & SODIUM PER 1 ML: Performed by: SURGERY

## 2022-12-10 PROCEDURE — 86140 C-REACTIVE PROTEIN: CPT | Performed by: INTERNAL MEDICINE

## 2022-12-10 RX ORDER — KETOROLAC TROMETHAMINE 15 MG/ML
15 INJECTION, SOLUTION INTRAMUSCULAR; INTRAVENOUS EVERY 6 HOURS PRN
Status: DISCONTINUED | OUTPATIENT
Start: 2022-12-10 | End: 2022-12-11 | Stop reason: HOSPADM

## 2022-12-10 RX ADMIN — DIATRIZOATE MEGLUMINE AND DIATRIZOATE SODIUM 200 ML: 660; 100 LIQUID ORAL; RECTAL at 11:34

## 2022-12-10 RX ADMIN — SODIUM CHLORIDE, POTASSIUM CHLORIDE, SODIUM LACTATE AND CALCIUM CHLORIDE 100 ML/HR: 600; 310; 30; 20 INJECTION, SOLUTION INTRAVENOUS at 06:08

## 2022-12-10 RX ADMIN — Medication 10 ML: at 08:34

## 2022-12-10 RX ADMIN — KETOROLAC TROMETHAMINE 15 MG: 15 INJECTION, SOLUTION INTRAMUSCULAR; INTRAVENOUS at 12:31

## 2022-12-10 RX ADMIN — SODIUM CHLORIDE, POTASSIUM CHLORIDE, SODIUM LACTATE AND CALCIUM CHLORIDE 100 ML/HR: 600; 310; 30; 20 INJECTION, SOLUTION INTRAVENOUS at 17:27

## 2022-12-10 RX ADMIN — KETOROLAC TROMETHAMINE 15 MG: 15 INJECTION, SOLUTION INTRAMUSCULAR; INTRAVENOUS at 00:27

## 2022-12-10 RX ADMIN — PHENOL 1 SPRAY: 1.5 LIQUID ORAL at 08:33

## 2022-12-10 RX ADMIN — ENOXAPARIN SODIUM 40 MG: 100 INJECTION SUBCUTANEOUS at 11:34

## 2022-12-10 RX ADMIN — ONDANSETRON 4 MG: 2 INJECTION INTRAMUSCULAR; INTRAVENOUS at 04:47

## 2022-12-10 RX ADMIN — FAMOTIDINE 20 MG: 10 INJECTION INTRAVENOUS at 20:53

## 2022-12-10 RX ADMIN — Medication 10 ML: at 20:53

## 2022-12-10 RX ADMIN — FAMOTIDINE 20 MG: 10 INJECTION INTRAVENOUS at 08:32

## 2022-12-10 RX ADMIN — ONDANSETRON 4 MG: 2 INJECTION INTRAMUSCULAR; INTRAVENOUS at 11:16

## 2022-12-10 RX ADMIN — KETOROLAC TROMETHAMINE 15 MG: 15 INJECTION, SOLUTION INTRAMUSCULAR; INTRAVENOUS at 20:53

## 2022-12-10 NOTE — PLAN OF CARE
Goal Outcome Evaluation:  Plan of Care Reviewed With: patient        Progress: no change  Outcome Evaluation: PT WITH NG TUBE IN PLACE, NO OUT PUT REPORTED BY SILVIA, PT C/O A LOT OF SNEEZING AND COUGHING EARLIER IN THE DAY SO WE DID ADB XRAY TO VERIFY PLACEMENT, PLACEMENT OKAY PER XRAY, STILL NO OUT PUT, I BELIEVE TUBE WAS TWISTED OR KINKED AT SOME POINT AS WE HAD TO DO EXTENSIVE WORK WITH IT THROUGHT THE NIGHT TO GET IT TO START HAVING OUT PUT, CHANGING THE NG TUBE OUT WAS REFUSED BY THE PT.Geni Mercado RN]

## 2022-12-10 NOTE — PROGRESS NOTES
[facility]     Surgery Progress Note    Patient Name: Chai Gaston  :    1965  MRN:    [id number]  Date of admission:  2022  Length of Stay: 2 days    Subjective   Passing some gas but still having some crampy abd pain.  AXR this morning shows some improvement with air seen in the colon.    Objective     Current Facility-Administered Medications:   •  Enoxaparin Sodium (LOVENOX) syringe 40 mg, 40 mg, Subcutaneous, Q24H, Dajuan Houston MD, 40 mg at 22 1246  •  famotidine (PEPCID) injection 20 mg, 20 mg, Intravenous, Q12H, Dajuan Houston MD, 20 mg at 12/10/22 0832  •  lactated ringers infusion, 100 mL/hr, Intravenous, Continuous, Chai Solano Jr., MD, Last Rate: 100 mL/hr at 12/10/22 0608, 100 mL/hr at 12/10/22 0608  •  meperidine (DEMEROL) injection 25 mg, 25 mg, Intravenous, Q4H PRN, Dajuan Houston MD  •  ondansetron (ZOFRAN) injection 4 mg, 4 mg, Intravenous, Q6H PRN, Chai Solano Jr., MD, 4 mg at 12/10/22 0447  •  phenol (CHLORASEPTIC) 1.4 % liquid 1 spray, 1 spray, Mouth/Throat, Q2H PRN, Dajuan Houston MD, 1 spray at 12/10/22 0833  •  sodium chloride 0.9 % bolus 1,000 mL, 1,000 mL, Intravenous, Once, Chai Solano Jr., MD  •  sodium chloride 0.9 % flush 10 mL, 10 mL, Intravenous, PRN, Chai Solano Jr., MD  •  sodium chloride 0.9 % flush 10 mL, 10 mL, Intravenous, PRN, Chai Solano Jr., MD  •  sodium chloride 0.9 % flush 10 mL, 10 mL, Intravenous, Q12H, Chai Solano Jr., MD, 10 mL at 12/10/22 0834  •  sodium chloride 0.9 % infusion 40 mL, 40 mL, Intravenous, PRN, Chai Solano Jr., MD    Current Diet:    Dietary Orders (From admission, onward)     Start     Ordered    22  NPO Diet NPO Type: Strict NPO  Diet Effective Now        Question:  NPO Type  Answer:  Strict NPO    22                Vitals:   Temp:  [97.8 °F (36.6 °C)-98.7 °F (37.1 °C)] 97.9 °F (36.6 °C)  Heart Rate:  [66-93] 66  Resp:  [16-20] 16  BP: (130-153)/(72-93)  152/75  Physical Exam   Constitutional: alert, no acute distress  HENT:  NCAT, no visible deformities or lesions, NG tube in place  Respiratory:  breathing not labored, respiratory effort appears normal  Cardiovascular:  heart regular rate and rhythm  Abdomen:  soft, nondistended, mildly tender  Musculoskeletal: moving all extremities symmetrically and purposefully  Neurologic:  no obvious motor or sensory deficits, alert & oriented x 3, speech clear    LABS:  CBC    CBC 12/8/22 12/9/22 12/10/22   WBC 14.02 (A) 6.94 6.48   RBC 5.45 5.27 5.21   Hemoglobin 15.8 15.5 15.2   Hematocrit 46.1 46.4 44.2   MCV 84.6 88.0 84.8   MCH 29.0 29.4 29.2   MCHC 34.3 33.4 34.4   RDW 11.9 (A) 12.1 (A) 11.7 (A)   Platelets 209 184 215   (A) Abnormal value            CMP    CMP 12/8/22 12/9/22 12/10/22   Glucose 112 (A) 92 69   BUN 17 17 18   Creatinine 1.13 1.18 1.14   Sodium 133 (A) 136 137   Potassium 4.4 4.7 4.4   Chloride 100 104 101   Calcium 9.3 9.1 9.2   Albumin 4.30 4.10    Total Bilirubin 0.6 0.6    Alkaline Phosphatase 52 55    AST (SGOT) 25 23    ALT (SGPT) 28 24    (A) Abnormal value       Comments are available for some flowsheets but are not being displayed.             IMAGING:  Imaging Results (Last 24 Hours)     Procedure Component Value Units Date/Time    XR Abdomen Flat & Upright [[id number]] Collected: 12/10/22 0632     Updated: 12/10/22 0635    Narrative:      PROCEDURE: XR ABDOMEN FLAT AND UPRIGHT     COMPARISON: 12/9/2022.     INDICATIONS: SBO - evaluate for worsening or improvement     FINDINGS: Three views were obtained.  There is probably no significant interval change in the   nasogastric (NG) tube position with its distal tip in the expected gastric lumen.  Its side port is   near the expected gastroesophageal (GE) junction.  Gas is seen throughout the colon, which is   thought to favor a partial small bowel obstruction rather than a complete mechanical small bowel   obstruction.  There are postoperative  changes of the abdomen and the lower lumbar spine.  No   pneumoperitoneum is seen.  Degenerative changes involve the imaged spine and the bilateral hip   joints.       Impression:       Possible partial small bowel obstruction.  The distal tip of the nasogastric (NG) tube   is in the expected gastric lumen.  No pneumoperitoneum.  Please see above comments for further   detail.             Please note that portions of this note were completed with a voice recognition program.     MORTEZA PINEDO JR, MD         Electronically Signed and Approved By: MORTEZA PINEDO JR, MD on 12/10/2022 at 6:32                        XR Abdomen 1 View [[id number]] Collected: 12/09/22 2256     Updated: 12/09/22 2259    Narrative:      PROCEDURE: XR ABDOMEN 1 VW     COMPARISON: 12/09/2022, 14:07.     INDICATIONS: VERIFYING NG TUBE PLACEMENT.     FINDINGS:   A single AP upright portable view was performed.  The distal nasogastric tube is in the expected   gastric lumen, similar in appearance to the prior study from 1407 hours, same day.  No   pneumoperitoneum is seen.  Otherwise, no significant interval change is identified since the prior   exam.       Impression:         The distal nasogastric tube (including its distal tip) is in the expected gastric antrum.             Please note that portions of this note were completed with a voice recognition program.     MORTEZA PINEDO JR, MD         Electronically Signed and Approved By: MORTEZA PINEDO JR, MD on 12/09/2022 at 22:56                        XR Abdomen KUB [[id number]] Collected: 12/09/22 1506     Updated: 12/09/22 1509    Narrative:      PROCEDURE: XR ABDOMEN KUB     COMPARISON: [facility], CR, ABDOMEN FLAT & UPRIGHT, 2/28/2019, 12:03.  [facility], CT, CT ABDOMEN PELVIS W CONTRAST, 12/08/2022, 18:18.     INDICATIONS: Small bowel obstruction     FINDINGS:   NG tube is in the stomach, the tip is 8 cm beyond the GE junction.  The stomach is not abnormally    distended.     Small bowel loops measure up to 4.5 cm in diameter.  No significant change in comparison to   yesterday CT scan is evident.     Stool is seen in the right hemicolon.     Metallic anchors in the mid abdomen are consistent with mesh ventral hernia repair.     Pedicle screws are seen at L4 and L5.       Impression:         KUB demonstrating an abnormal bowel gas pattern.  No significant change in comparison to 12/8/2022   is evident.     NG tube tip is 8 cm beyond the GE junction.  The stomach is not abnormally distended.            OWEN MEYER MD         Electronically Signed and Approved By: OWEN MEYER MD on 12/09/2022 at 15:06                             Assessment / Plan   Assessment:   Active Hospital Problems    Diagnosis    • **Small bowel obstruction (HCC)    • History of abdominal surgery    SBO appears to be resolving but will further quality with oral contrast challenge    Plan:    Gastrografin challenge  Appreciate medical management by Dr. Houston        Electronically signed by Lopez Kaur MD, 12/10/22, 10:10 AM EST.

## 2022-12-10 NOTE — PROGRESS NOTES
[facility]   Hospitalist Progress Note  Date: 12/10/2022  Patient Name: Chai Gaston  : 1965  MRN: [id number]  Date of admission: 2022      Subjective   Subjective     Chief Complaint: Abdominal pain    Summary:   Chai Gaston is a 57 y.o. male past medical history of Crohn's disease, partial colectomy and previous SBO that presents to the emergency department for evaluation of abdominal pain x1 week that he got acutely worse over the past 2 days.  He describes it as stabbing, diffuse, does not radiate, constant, no known aggravating relieving factors associated with nausea.  He denies any fevers, chills and sweats, vomiting, chest pain or shortness of breath, palpitations, diarrhea, dysuria, weakness, rash.  Work-up in the emergency department consistent with small bowel obstruction.  Surgery has been called and patient has an NG tube placed to suction at time of my evaluation.  Patient be admitted for ongoing monitoring and management and further surgical evaluation.     Interval Followup:   Vital signs stable on room air.  No nausea or vomiting.  Abdominal pain tolerable.  Repeat abdominal x-ray with gas in colon indicating partial small bowel obstruction with improvement  Has been passing gas and having BM  NG to suction with only 400 mL output.  Readjusted with improvement and increased output.  NG clamped now await Gastrografin challenge test to finish    Review of Systems   All systems were reviewed and negative except for: Summary interval follow-up    Objective   Objective     Vitals:   Temp:  [97.9 °F (36.6 °C)-99.4 °F (37.4 °C)] 99.4 °F (37.4 °C)  Heart Rate:  [] 100  Resp:  [16-18] 18  BP: (130-153)/(72-95) 143/86  Physical Exam       Constitutional: Awake, alert, no acute distress              Eyes: Pupils equal, sclerae anicteric, no conjunctival injection              HENT: NCAT, mucous membranes moist              Neck: Supple, no thyromegaly, no lymphadenopathy,  trachea midline              Respiratory: Clear to auscultation bilaterally, nonlabored respirations               Cardiovascular: RRR, no murmurs, rubs, or gallops, palpable pedal pulses bilaterally              Gastrointestinal: Positive bowel sounds, soft, diffusely tender to deep palpation, nondistended              Musculoskeletal: No bilateral ankle edema, no clubbing or cyanosis to extremities              Psychiatric: Appropriate affect, cooperative              Neurologic: Oriented x 3, strength symmetric in all extremities, Cranial Nerves grossly intact to confrontation, speech clear              Skin: No rashes        Result Review    Result Review:  I have personally reviewed the results from the time of this admission to 12/10/2022 17:35 EST and agree with these findings:  [x]  Laboratory  []  Microbiology  [x]  Radiology  []  EKG/Telemetry   []  Cardiology/Vascular   []  Pathology  [x]  Old records  [x]  Other: Medications    Assessment & Plan   Assessment / Plan     Assessment:  Partial small bowel obstruction.  Recurrent.  Improving  Crohn's disease in remission on mesalamine.  Leukocytosis.  Resolved  PTSD.  Hyperlipidemia     Plan:  Continue IV fluid  Appreciate general surgery input.  NG clamped as per general surgery for Gastrografin  NPO./Diet as per general surgery.  Negative inflammatory markers including CRP and ESR. doubt Crohn's flare.  Hold IV steroids  IV Toradol and Demerol.  Patient allergic to multiple pain medications.  IV Pepcid.    Follow-up abdominal x-ray.    Discussed plan with RN and .  Discharge home when cleared by general surgery likely in a.m.    DVT prophylaxis:  Medical and mechanical DVT prophylaxis orders are present.    CODE STATUS:   Code Status (Patient has no pulse and is not breathing): CPR (Attempt to Resuscitate)  Medical Interventions (Patient has pulse or is breathing): Full Support      Part of this note may be an electronic  transcription/translation of spoken language to printed text using the Dragon Dictation System.       Electronically signed by Dajuan Houston MD, 12/10/22, 5:38 PM EST.

## 2022-12-11 VITALS
DIASTOLIC BLOOD PRESSURE: 90 MMHG | HEART RATE: 86 BPM | HEIGHT: 72 IN | TEMPERATURE: 98 F | BODY MASS INDEX: 29.17 KG/M2 | WEIGHT: 215.39 LBS | OXYGEN SATURATION: 97 % | RESPIRATION RATE: 20 BRPM | SYSTOLIC BLOOD PRESSURE: 147 MMHG

## 2022-12-11 PROBLEM — K56.609 SMALL BOWEL OBSTRUCTION: Status: RESOLVED | Noted: 2022-12-08 | Resolved: 2022-12-11

## 2022-12-11 PROCEDURE — 99238 HOSP IP/OBS DSCHRG MGMT 30/<: CPT | Performed by: INTERNAL MEDICINE

## 2022-12-11 PROCEDURE — 99231 SBSQ HOSP IP/OBS SF/LOW 25: CPT | Performed by: SURGERY

## 2022-12-11 RX ADMIN — SODIUM CHLORIDE, POTASSIUM CHLORIDE, SODIUM LACTATE AND CALCIUM CHLORIDE 100 ML/HR: 600; 310; 30; 20 INJECTION, SOLUTION INTRAVENOUS at 02:43

## 2022-12-11 RX ADMIN — Medication 10 ML: at 08:06

## 2022-12-11 NOTE — PLAN OF CARE
Goal Outcome Evaluation:  Plan of Care Reviewed With: patient        Progress: improving  Outcome Evaluation: ng tube removed, pt tolerating regular diet (has only consumed clears) has continued passing gas and having bm.Geni Mercado RN

## 2022-12-11 NOTE — PROGRESS NOTES
[facility]     Surgery Progress Note    Patient Name: Chai Gaston  :    1965  MRN:    [id number]  Date of admission:  2022  Length of Stay: 3 days    Subjective   No complaints this morning.  GG challenge showed contrast in colon.  NG d/c last night and pt eating reg diet this morning.    Objective       Current Diet:    Dietary Orders (From admission, onward)     Start     Ordered    12/10/22 2030  Diet: Regular/House Diet; Texture: Regular Texture (IDDSI 7); Fluid Consistency: Thin (IDDSI 0)  Diet Effective Now        References:    Diet Order [place]   Question Answer Comment   Diets: Regular/House Diet    Texture: Regular Texture (IDDSI 7)    Fluid Consistency: Thin (IDDSI 0)        12/10/22 2029                Vitals:   Temp:  [98.1 °F (36.7 °C)-99.4 °F (37.4 °C)] 98.2 °F (36.8 °C)  Heart Rate:  [] 73  Resp:  [16-18] 18  BP: (110-153)/(70-95) 142/80  Physical Exam   Constitutional: alert, no acute distress  Respiratory:  breathing not labored, respiratory effort appears normal  Cardiovascular:  heart regular rate and rhythm  Abdomen:  soft, nondistended, nontender  Musculoskeletal: moving all extremities symmetrically and purposefully  Neurologic:  no obvious motor or sensory deficits, speech clear  Psychiatric:  judgment and insight intact, mood normal, affect appropriate, cooperative    LABS:  CBC    CBC 12/8/22 12/9/22 12/10/22   WBC 14.02 (A) 6.94 6.48   RBC 5.45 5.27 5.21   Hemoglobin 15.8 15.5 15.2   Hematocrit 46.1 46.4 44.2   MCV 84.6 88.0 84.8   MCH 29.0 29.4 29.2   MCHC 34.3 33.4 34.4   RDW 11.9 (A) 12.1 (A) 11.7 (A)   Platelets 209 184 215   (A) Abnormal value            CMP    CMP 12/8/22 12/9/22 12/10/22   Glucose 112 (A) 92 69   BUN 17 17 18   Creatinine 1.13 1.18 1.14   Sodium 133 (A) 136 137   Potassium 4.4 4.7 4.4   Chloride 100 104 101   Calcium 9.3 9.1 9.2   Albumin 4.30 4.10    Total Bilirubin 0.6 0.6    Alkaline Phosphatase 52 55    AST (SGOT) 25 23    ALT  (SGPT) 28 24    (A) Abnormal value       Comments are available for some flowsheets but are not being displayed.             Lab Results (last 24 hours)     Procedure Component Value Units Date/Time    Blood Culture - Blood, Arm, Left [[id number]]  (Normal) Collected: 12/08/22 1642    Specimen: Blood from Arm, Left Updated: 12/10/22 1701     Blood Culture No growth at 2 days    Blood Culture - Blood, Arm, Right [[id number]]  (Normal) Collected: 12/08/22 1642    Specimen: Blood from Arm, Right Updated: 12/10/22 1701     Blood Culture No growth at 2 days          IMAGING:  Imaging Results (Last 24 Hours)     Procedure Component Value Units Date/Time    XR Abdomen Flat & Upright [[id number]] Collected: 12/10/22 1945     Updated: 12/10/22 1949    Narrative:      PROCEDURE: XR ABDOMEN FLAT AND UPRIGHT     COMPARISON: 12/10/2022, 6:19.     INDICATIONS: SBO - oral contrast given today - evaluate for progression of contrast to   colon/rectum.     FINDINGS: Three views reveal a nasogastric (NG) tube in place with its distal tip in the expected   gastric lumen.  There is slight motion artifact on the study.  Oral contrast agent is seen   throughout the colon and would favor a partial small bowel obstruction or possible ileus.  A   complete small bowel obstruction is thought to be unlikely.  Mildly dilated small bowel loops are   seen.  There has been ventral hernia repair, especially involving the right lower quadrant.  There   are postoperative changes of the lower lumbar spine.  There has been cholecystectomy.  External   artifacts are noted.  No pneumoperitoneum is seen.  There may be mild subsegmental atelectasis in   the lung bases.  Mild cardiomegaly is possible.       Impression:       Oral contrast agent is seen throughout the colon, including the rectosigmoid colon,   which would be against a complete small bowel obstruction.  No pneumoperitoneum is seen.  The   nasogastric (NG) tube remains in place, as discussed.   Please see above comments for further   detail.             Please note that portions of this note were completed with a voice recognition program.     MORTEZA PINEDO JR, MD         Electronically Signed and Approved By: MORTEZA PINEDO JR, MD on 12/10/2022 at 19:45                            Assessment / Plan   Assessment:   Active Hospital Problems    Diagnosis    • Small bowel obstruction - Resolved    • History of abdominal surgery        Plan:    Can d/c home from my perspective  Follow-up with PCP     Electronically signed by Lopez Kaur MD, 12/11/22, 9:42 AM EST.

## 2022-12-11 NOTE — DISCHARGE SUMMARY
[facility]        HOSPITALIST  DISCHARGE SUMMARY    Patient Name: Chai Gaston  : 1965  MRN: [id number]    Date of Admission: 2022  Date of Discharge:  2022  Primary Care Physician: Raheem Tobar MD    Consults     Date and Time Order Name Status Description    2022 10:06 PM Inpatient General Surgery Consult Completed     2022  7:07 PM Surgery (on-call MD unless specified)      2022  7:00 PM Inpatient Hospitalist Consult            Active and Resolved Hospital Problems:  Active Hospital Problems    Diagnosis POA   • History of abdominal surgery [Z98.890] Not Applicable      Resolved Hospital Problems    Diagnosis POA   • **Small bowel obstruction (HCC) [K56.609] Yes   Partial small bowel obstruction.  Recurrent.  Improving  Crohn's disease in remission on mesalamine.  Leukocytosis.  Resolved  PTSD.  Hyperlipidemia         Hospital Course     Hospital Course:  Chai Gaston is a 57 y.o. male  past medical history of Crohn's disease, partial colectomy and previous SBO that presents to the emergency department for evaluation of abdominal pain x1 week that he got acutely worse over the past 2 days.  He describes it as stabbing, diffuse, does not radiate, constant, no known aggravating relieving factors associated with nausea.  He denies any fevers, chills and sweats, vomiting, chest pain or shortness of breath, palpitations, diarrhea, dysuria, weakness, rash.  Work-up in the emergency department consistent with small bowel obstruction.  Surgery has been called and patient has an NG tube placed to suction at time of my evaluation.  Patient be admitted for ongoing monitoring and management and further surgical evaluation.  Patient was given Gastrografin challenge by general surgery.  Contrast was found in the colon indicating partial small bowel obstruction.     Interval Followup:   Vital signs stable on room air.  No nausea or vomiting.  Abdominal pain  resolved.  Repeat abdominal x-ray with gas in colon indicating partial small bowel obstruction with improvement  Has been passing gas and having BM  NG has been DC'd.  Cleared by general surgery to be released.  Patient wanted to go home as soon as possible.  Inflammatory markers of chronicity is not elevated.          DISCHARGE Follow Up Recommendations for labs and diagnostics: PCP and GI.      Day of Discharge     Vital Signs:  Temp:  [98 °F (36.7 °C)-98.3 °F (36.8 °C)] 98 °F (36.7 °C)  Heart Rate:  [73-88] 86  Resp:  [18-20] 20  BP: (110-150)/(70-90) 147/90    Physical Exam:      Constitutional: Awake, alert, no acute distress              Eyes: Pupils equal, sclerae anicteric, no conjunctival injection              HENT: NCAT, mucous membranes moist              Neck: Supple, no thyromegaly, no lymphadenopathy, trachea midline              Respiratory: Clear to auscultation bilaterally, nonlabored respirations               Cardiovascular: RRR, no murmurs, rubs, or gallops, palpable pedal pulses bilaterally              Gastrointestinal: Positive bowel sounds, soft, nontender, nondistended              Musculoskeletal: No bilateral ankle edema, no clubbing or cyanosis to extremities              Psychiatric: Appropriate affect, cooperative              Neurologic: Oriented x 3, strength symmetric in all extremities, Cranial Nerves grossly intact to confrontation, speech clear              Skin: No rashes     Discharge Details        Discharge Medications      Changes to Medications      Instructions Start Date   polyethylene glycol 236 g solution  Commonly known as: GoLYTELY  What changed:   how much to take  how to take this  when to take this   Drink 10 ounces every 10 to 15 minutes until entire bottle has been ingested.         Continue These Medications      Instructions Start Date   divalproex 500 MG 24 hr tablet  Commonly known as: DEPAKOTE ER   1,000 mg, Oral, Nightly      EPINEPHrine 0.3 MG/0.3ML  solution auto-injector injection  Commonly known as: EPIPEN   0.3 mL, Once      ergocalciferol 1.25 MG (85756 UT) capsule  Commonly known as: ERGOCALCIFEROL   50,000 Units, Oral, Weekly      fexofenadine 180 MG tablet  Commonly known as: ALLEGRA   180 mg, Oral, Daily      hydrOXYzine 50 MG tablet  Commonly known as: ATARAX   50 mg, Oral, Nightly      ID Now COVID-19 kit  Generic drug: COVID-19 Test   TEST AS DIRECTED TODAY      K-Phos-Neutral [id number] MG tablet   1 tablet, Oral, 2 Times Daily      losartan 25 MG tablet  Commonly known as: COZAAR   12.5 mg, Oral, Daily      mesalamine 1.2 g EC tablet  Commonly known as: LIALDA   1,200 mg, Oral, 4 Times Daily      mupirocin 2 % ointment  Commonly known as: BACTROBAN   1 application, Topical, Daily      ondansetron ODT 8 MG disintegrating tablet  Commonly known as: ZOFRAN-ODT   8 mg, Translingual, Every 8 Hours PRN      pantoprazole 40 MG EC tablet  Commonly known as: PROTONIX   40 mg, Oral, Daily      prazosin 2 MG capsule  Commonly known as: MINIPRESS   8 mg, Oral, Nightly      QUEtiapine  MG 24 hr tablet  Commonly known as: SEROquel XR   200 mg, Nightly      rosuvastatin 40 MG tablet  Commonly known as: CRESTOR   20 mg, Oral, Nightly      sildenafil 100 MG tablet  Commonly known as: VIAGRA   100 mg, Oral, As Needed      tamsulosin 0.4 MG capsule 24 hr capsule  Commonly known as: FLOMAX   1 capsule, Daily      traZODone 100 MG tablet  Commonly known as: DESYREL   100 mg, Oral, Nightly             Allergies   Allergen Reactions   • Bee Venom Anaphylaxis   • Hydromorphone Unknown - High Severity   • Morphine Anxiety and Hallucinations   • Oxycodone-Acetaminophen Swelling   • Sulfa Antibiotics Shortness Of Breath   • Wasp Venom Unknown - High Severity   • Codeine Unknown - High Severity   • Lamotrigine Other (See Comments)   • Lithium Unknown - Low Severity   • Oxycodone Unknown - Low Severity   • Oxycodone Hcl Unknown - Low Severity   • Penicillins Rash        Discharge Disposition:  Home or Self Care    Diet:  Diet Instructions     Advance Diet As Tolerated            Discharge Activity:   Activity Instructions     Activity as Tolerated            CODE STATUS:  Code Status and Medical Interventions:   Ordered at: 12/08/22 1919     Code Status (Patient has no pulse and is not breathing):    CPR (Attempt to Resuscitate)     Medical Interventions (Patient has pulse or is breathing):    Full Support         Future Appointments   Date Time Provider Department Center   1/9/2023  7:00 AM Tatum Cruz APRN [facility]       Additional Instructions for the Follow-ups that You Need to Schedule     Discharge Follow-up with PCP   As directed       Currently Documented PCP:    Raheem Tobar MD    PCP Phone Number:    [phone]     Follow Up Details: 1 week         [facility] Follow[facility]   As directed      [facility]               Pertinent  and/or Most Recent Results     PROCEDURES:   * Cannot find OR case *     LAB RESULTS:      Lab 12/10/22  0412 12/09/22  1258 12/08/22  2100 12/08/22  1642   WBC 6.48 6.94  --  14.02*   HEMOGLOBIN 15.2 15.5  --  15.8   HEMATOCRIT 44.2 46.4  --  46.1   PLATELETS 215 184  --  209   NEUTROS ABS 3.59 4.54  --  11.09*   IMMATURE GRANS (ABS) 0.01 0.02  --  0.15*   LYMPHS ABS 2.05 1.61  --  1.52   MONOS ABS 0.61 0.68  --  1.19*   EOS ABS 0.19 0.07  --  0.04   MCV 84.8 88.0  --  84.6   SED RATE 10  --   --   --    CRP 1.65*  --   --   --    LACTATE  --   --  1.4 2.2*         Lab 12/10/22  0412 12/09/22  1258 12/08/22  1642   SODIUM 137 136 133*   POTASSIUM 4.4 4.7 4.4   CHLORIDE 101 104 100   CO2 21.5* 22.5 21.1*   ANION GAP 14.5 9.5 11.9   BUN 18 17 17   CREATININE 1.14 1.18 1.13   EGFR 74.1 71.1 74.9   GLUCOSE 69 92 112*   CALCIUM 9.2 9.1 9.3   MAGNESIUM 2.0  --   --    PHOSPHORUS 3.1  --   --          Lab 12/09/22  1258 12/08/22  1642   TOTAL PROTEIN 6.7 7.0   ALBUMIN 4.10 4.30   GLOBULIN 2.6 2.7    ALT (SGPT) 24 28   AST (SGOT) 23 25   BILIRUBIN 0.6 0.6   ALK PHOS 55 52   LIPASE  --  27                     Brief Urine Lab Results     None        Microbiology Results (last 10 days)     Procedure Component Value - Date/Time    Blood Culture - Blood, Arm, Left [[id number]]  (Normal) Collected: 12/08/22 1642    Lab Status: Preliminary result Specimen: Blood from Arm, Left Updated: 12/10/22 1701     Blood Culture No growth at 2 days    Blood Culture - Blood, Arm, Right [[id number]]  (Normal) Collected: 12/08/22 1642    Lab Status: Preliminary result Specimen: Blood from Arm, Right Updated: 12/10/22 1701     Blood Culture No growth at 2 days          XR Abdomen 1 View    Result Date: 12/9/2022  Impression:   The distal nasogastric tube (including its distal tip) is in the expected gastric antrum.     Please note that portions of this note were completed with a voice recognition program.  MORTEZA PINEDO JR, MD       Electronically Signed and Approved By: MORTEZA PINEDO JR, MD on 12/09/2022 at 22:56              XR Abdomen Flat & Upright    Result Date: 12/10/2022  Impression:  Oral contrast agent is seen throughout the colon, including the rectosigmoid colon, which would be against a complete small bowel obstruction.  No pneumoperitoneum is seen.  The nasogastric (NG) tube remains in place, as discussed.  Please see above comments for further detail.     Please note that portions of this note were completed with a voice recognition program.  MORTEZA PINEDO JR, MD       Electronically Signed and Approved By: MORTEZA PINEDO JR, MD on 12/10/2022 at 19:45              XR Abdomen Flat & Upright    Result Date: 12/10/2022  Impression:  Possible partial small bowel obstruction.  The distal tip of the nasogastric (NG) tube is in the expected gastric lumen.  No pneumoperitoneum.  Please see above comments for further detail.     Please note that portions of this note were completed with a voice recognition program.  MORTEZA SWANSON  SHAHIDA LIMA MD       Electronically Signed and Approved By: MORTEZA PINEDO JR, MD on 12/10/2022 at 6:32              CT Abdomen Pelvis With Contrast    Result Date: 11/25/2022  Impression:   1. No acute intra-abdominal or intrapelvic abnormality     SILVIA LOBO MD       Electronically Signed and Approved By: SILVIA LOBO MD on 11/25/2022 at 9:41             XR Abdomen KUB    Result Date: 12/9/2022  Impression:   KUB demonstrating an abnormal bowel gas pattern.  No significant change in comparison to 12/8/2022 is evident.  NG tube tip is 8 cm beyond the GE junction.  The stomach is not abnormally distended.     OWEN MEYER MD       Electronically Signed and Approved By: OWEN MEYER MD on 12/09/2022 at 15:06                           Imaging Results (All)     Procedure Component Value Units Date/Time    XR Abdomen Flat & Upright [[id number]] Collected: 12/10/22 1945     Updated: 12/10/22 1949    Narrative:      PROCEDURE: XR ABDOMEN FLAT AND UPRIGHT     COMPARISON: 12/10/2022, 6:19.     INDICATIONS: SBO - oral contrast given today - evaluate for progression of contrast to   colon/rectum.     FINDINGS: Three views reveal a nasogastric (NG) tube in place with its distal tip in the expected   gastric lumen.  There is slight motion artifact on the study.  Oral contrast agent is seen   throughout the colon and would favor a partial small bowel obstruction or possible ileus.  A   complete small bowel obstruction is thought to be unlikely.  Mildly dilated small bowel loops are   seen.  There has been ventral hernia repair, especially involving the right lower quadrant.  There   are postoperative changes of the lower lumbar spine.  There has been cholecystectomy.  External   artifacts are noted.  No pneumoperitoneum is seen.  There may be mild subsegmental atelectasis in   the lung bases.  Mild cardiomegaly is possible.       Impression:       Oral contrast agent is seen throughout the colon, including the  rectosigmoid colon,   which would be against a complete small bowel obstruction.  No pneumoperitoneum is seen.  The   nasogastric (NG) tube remains in place, as discussed.  Please see above comments for further   detail.             Please note that portions of this note were completed with a voice recognition program.     MORTEZA PINEDO JR, MD         Electronically Signed and Approved By: MORTEZA PINEDO JR, MD on 12/10/2022 at 19:45                        XR Abdomen Flat & Upright [[id number]] Collected: 12/10/22 0632     Updated: 12/10/22 0635    Narrative:      PROCEDURE: XR ABDOMEN FLAT AND UPRIGHT     COMPARISON: 12/9/2022.     INDICATIONS: SBO - evaluate for worsening or improvement     FINDINGS: Three views were obtained.  There is probably no significant interval change in the   nasogastric (NG) tube position with its distal tip in the expected gastric lumen.  Its side port is   near the expected gastroesophageal (GE) junction.  Gas is seen throughout the colon, which is   thought to favor a partial small bowel obstruction rather than a complete mechanical small bowel   obstruction.  There are postoperative changes of the abdomen and the lower lumbar spine.  No   pneumoperitoneum is seen.  Degenerative changes involve the imaged spine and the bilateral hip   joints.       Impression:       Possible partial small bowel obstruction.  The distal tip of the nasogastric (NG) tube   is in the expected gastric lumen.  No pneumoperitoneum.  Please see above comments for further   detail.             Please note that portions of this note were completed with a voice recognition program.     MORTEZA PINEDO JR, MD         Electronically Signed and Approved By: MORTEZA PINEDO JR, MD on 12/10/2022 at 6:32                        XR Abdomen 1 View [[id number]] Collected: 12/09/22 2256     Updated: 12/09/22 2259    Narrative:      PROCEDURE: XR ABDOMEN 1 VW     COMPARISON: 12/09/2022, 14:07.     INDICATIONS: VERIFYING NG  TUBE PLACEMENT.     FINDINGS:   A single AP upright portable view was performed.  The distal nasogastric tube is in the expected   gastric lumen, similar in appearance to the prior study from 1407 hours, same day.  No   pneumoperitoneum is seen.  Otherwise, no significant interval change is identified since the prior   exam.       Impression:         The distal nasogastric tube (including its distal tip) is in the expected gastric antrum.             Please note that portions of this note were completed with a voice recognition program.     MORTEZA PINEDO JR, MD         Electronically Signed and Approved By: MORTEZA PINEDO JR, MD on 12/09/2022 at 22:56                        XR Abdomen KUB [[id number]] Collected: 12/09/22 1506     Updated: 12/09/22 1509    Narrative:      PROCEDURE: XR ABDOMEN KUB     COMPARISON: [facility], CR, ABDOMEN FLAT & UPRIGHT, 2/28/2019, 12:03.  [facility], CT, CT ABDOMEN PELVIS W CONTRAST, 12/08/2022, 18:18.     INDICATIONS: Small bowel obstruction     FINDINGS:   NG tube is in the stomach, the tip is 8 cm beyond the GE junction.  The stomach is not abnormally   distended.     Small bowel loops measure up to 4.5 cm in diameter.  No significant change in comparison to   yesterday CT scan is evident.     Stool is seen in the right hemicolon.     Metallic anchors in the mid abdomen are consistent with mesh ventral hernia repair.     Pedicle screws are seen at L4 and L5.       Impression:         KUB demonstrating an abnormal bowel gas pattern.  No significant change in comparison to 12/8/2022   is evident.     NG tube tip is 8 cm beyond the GE junction.  The stomach is not abnormally distended.            OWEN MEYER MD         Electronically Signed and Approved By: OWEN MEYER MD on 12/09/2022 at 15:06                     CT Abdomen Pelvis With Contrast [[id number]] Collected: 12/08/22 1852     Updated: 12/08/22 1855    Narrative:      PROCEDURE: CT ABDOMEN  PELVIS W CONTRAST     COMPARISON: [facility], CT, CT ABDOMEN PELVIS W CONTRAST, 11/25/2022, 9:05.     INDICATIONS: abdominal pain     PROTOCOL:   Standard imaging protocol performed      RADIATION:   DLP: 1461.1mGy*cm    Automated exposure control was utilized to minimize radiation dose.   CONTRAST: 100cc Isovue 370 I.V.     TECHNIQUE: Axial images of the abdomen and pelvis with intravenous contrast.     ABDOMEN:  Stable 3 mm noncalcified nodule along the right hemidiaphragm.  Prior cholecystectomy.    The liver, spleen, pancreas, adrenal glands and kidneys are normal.     PELVIS:  Ventral hernia mesh is present.  There is a surgical suture line in the small bowel.  The   proximal small bowel is dilated and fluid-filled.  The distal loops are decompressed.  There is   bowel wall thickening with stranding in the adjacent mesentery in the loop of small bowel proximal   to the suture line.  No evidence of perforation or abscess.  No evidence of pneumatosis.  No   evidence of portal venous gas.  The abdominal aorta has a normal caliber.  Pedicle screws and rods   are present at L4-L5.  There is mild enlargement of the prostate gland.  The urinary bladder is   normal.     IMPRESSION:  Findings consistent with small bowel obstruction.  There is abnormal bowel wall   thickening with stranding in the adjacent mesentery in the loop of small bowel proximal to the   surgical suture line.  No evidence of bowel perforation or abscess.     KELLY JOHNSON MD         Electronically Signed and Approved By: KELLY JOHNSON MD on 12/08/2022 at 18:52                            Labs Pending at Discharge:  Pending Labs     Order Current Status    Blood Culture - Blood, Arm, Left Preliminary result    Blood Culture - Blood, Arm, Right Preliminary result              Time spent on Discharge including face to face service: Less than 30 minutes  Part of this note may be an electronic transcription/translation of spoken  language to printed text using the Dragon Dictation System.     TElectronically signed by Dajuan Houston MD, 12/11/22, 4:36 PM EST.

## 2022-12-12 ENCOUNTER — READMISSION MANAGEMENT (OUTPATIENT)
Dept: CALL CENTER | Facility: HOSPITAL | Age: 57
End: 2022-12-12

## 2022-12-12 NOTE — OUTREACH NOTE
Prep Survey    Flowsheet Row Responses   [facility] patient discharged from? [facility]   Is LACE score < 7 ? No   Emergency Room discharge w/ pulse ox? No   Eligibility Readm Mgmt   Discharge diagnosis Small bowel obstruction   Does the patient have one of the following disease processes/diagnoses(primary or secondary)? Other   Does the patient have Home health ordered? No   Is there a DME ordered? No   Prep survey completed? Yes          JESSICA SOUSA - Registered Nurse

## 2022-12-13 LAB
BACTERIA SPEC AEROBE CULT: NORMAL
BACTERIA SPEC AEROBE CULT: NORMAL

## 2022-12-15 ENCOUNTER — READMISSION MANAGEMENT (OUTPATIENT)
Dept: CALL CENTER | Facility: HOSPITAL | Age: 57
End: 2022-12-15

## 2022-12-15 NOTE — OUTREACH NOTE
Medical Week 1 Survey    Flowsheet Row Responses   [facility] patient discharged from? [facility]   Does the patient have one of the following disease processes/diagnoses(primary or secondary)? Other   Week 1 attempt successful? No   Unsuccessful attempts Attempt 1  [Attempted pt and wife]          CODY KUMAR - Registered Nurse

## 2022-12-20 ENCOUNTER — READMISSION MANAGEMENT (OUTPATIENT)
Dept: CALL CENTER | Facility: HOSPITAL | Age: 57
End: 2022-12-20

## 2022-12-20 NOTE — OUTREACH NOTE
Medical Week 1 Survey    Flowsheet Row Responses   [facility] facility patient discharged from? [facility]   Does the patient have one of the following disease processes/diagnoses(primary or secondary)? Other   Week 1 attempt successful? No   Unsuccessful attempts Attempt 2          YUDELKA CAIN - Registered Nurse

## 2022-12-28 ENCOUNTER — READMISSION MANAGEMENT (OUTPATIENT)
Dept: CALL CENTER | Facility: HOSPITAL | Age: 57
End: 2022-12-28

## 2022-12-28 NOTE — OUTREACH NOTE
Medical Week 3 Survey    Flowsheet Row Responses   [facility] patient discharged from? [facility]   Does the patient have one of the following disease processes/diagnoses(primary or secondary)? Other   Week 3 attempt successful? Yes   Call start time 1000   Call end time 1001   Discharge diagnosis Small bowel obstruction   Person spoke with today (if not patient) and relationship Patient   Meds reviewed with patient/caregiver? Yes   Is the patient taking all medications as directed (includes completed medication regime)? Yes   Does the patient have a primary care provider?  Yes   Has the patient kept scheduled appointments due by today? Yes   Has home health visited the patient within 72 hours of discharge? N/A   Psychosocial issues? No   Did the patient receive a copy of their discharge instructions? Yes   Nursing interventions Reviewed instructions with patient   What is the patient's perception of their health status since discharge? Improving   Is the patient/caregiver able to teach back signs and symptoms related to disease process for when to call PCP? Yes   Is the patient/caregiver able to teach back signs and symptoms related to disease process for when to call 911? Yes   Is the patient/caregiver able to teach back the hierarchy of who to call/visit for symptoms/problems? PCP, Specialist, Home health nurse, Urgent Care, ED, 911 Yes   Week 3 Call Completed? Yes   Graduated Yes   Is the patient interested in additional calls from an ambulatory ?  NOTE:  applies to high risk patients requiring additional follow-up. No   Did the patient feel the follow up calls were helpful during their recovery period? Yes   Was the number of calls appropriate? Yes   Graduated/Revoked comments Patient doing great with no concers, Patient was very pleased with his care and nursing staff on the 5th floor.          SULLY ALANIZ - Registered Nurse

## 2023-01-09 ENCOUNTER — OFFICE VISIT (OUTPATIENT)
Dept: FAMILY MEDICINE CLINIC | Facility: CLINIC | Age: 58
End: 2023-01-09
Payer: OTHER GOVERNMENT

## 2023-01-09 VITALS
OXYGEN SATURATION: 96 % | WEIGHT: 256 LBS | DIASTOLIC BLOOD PRESSURE: 64 MMHG | SYSTOLIC BLOOD PRESSURE: 128 MMHG | HEIGHT: 72 IN | HEART RATE: 92 BPM | BODY MASS INDEX: 34.67 KG/M2

## 2023-01-09 DIAGNOSIS — Z11.59 NEED FOR HEPATITIS C SCREENING TEST: ICD-10-CM

## 2023-01-09 DIAGNOSIS — I10 ESSENTIAL HYPERTENSION: ICD-10-CM

## 2023-01-09 DIAGNOSIS — E78.2 MIXED HYPERLIPIDEMIA: ICD-10-CM

## 2023-01-09 DIAGNOSIS — Z00.00 ANNUAL PHYSICAL EXAM: Primary | ICD-10-CM

## 2023-01-09 DIAGNOSIS — E55.9 VITAMIN D DEFICIENCY: ICD-10-CM

## 2023-01-09 DIAGNOSIS — Z13.29 SCREENING FOR THYROID DISORDER: ICD-10-CM

## 2023-01-09 DIAGNOSIS — Z12.5 SCREENING FOR PROSTATE CANCER: ICD-10-CM

## 2023-01-09 PROCEDURE — 99396 PREV VISIT EST AGE 40-64: CPT | Performed by: NURSE PRACTITIONER
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
## 2024-01-01 ENCOUNTER — APPOINTMENT (OUTPATIENT)
Dept: CT IMAGING | Facility: HOSPITAL | Age: 59
DRG: 389 | End: 2024-01-01
Payer: OTHER GOVERNMENT

## 2024-01-01 ENCOUNTER — APPOINTMENT (OUTPATIENT)
Dept: GENERAL RADIOLOGY | Facility: HOSPITAL | Age: 59
DRG: 389 | End: 2024-01-01
Payer: OTHER GOVERNMENT

## 2024-01-01 ENCOUNTER — HOSPITAL ENCOUNTER (INPATIENT)
Facility: HOSPITAL | Age: 59
LOS: 4 days | Discharge: HOME | DRG: 389 | End: 2024-01-05
Attending: EMERGENCY MEDICINE | Admitting: INTERNAL MEDICINE
Payer: OTHER GOVERNMENT

## 2024-01-01 DIAGNOSIS — K56.609 SMALL BOWEL OBSTRUCTION: Primary | ICD-10-CM

## 2024-01-01 LAB
ALBUMIN SERPL-MCNC: 4.4 G/DL (ref 3.5–5.2)
ALBUMIN/GLOB SERPL: 1.4 G/DL
ALP SERPL-CCNC: 69 U/L (ref 39–117)
ALT SERPL W P-5'-P-CCNC: 41 U/L (ref 1–41)
ANION GAP SERPL CALCULATED.3IONS-SCNC: 15.3 MMOL/L (ref 5–15)
AST SERPL-CCNC: 34 U/L (ref 1–40)
BASOPHILS # BLD AUTO: 0.03 10*3/MM3 (ref 0–0.2)
BASOPHILS NFR BLD AUTO: 0.2 % (ref 0–1.5)
BILIRUB SERPL-MCNC: 0.6 MG/DL (ref 0–1.2)
BILIRUB UR QL STRIP: NEGATIVE
BUN SERPL-MCNC: 11 MG/DL (ref 8–23)
BUN/CREAT SERPL: 8.9 (ref 7–25)
CALCIUM SPEC-SCNC: 9.5 MG/DL (ref 8.6–10.5)
CHLORIDE SERPL-SCNC: 99 MMOL/L (ref 98–107)
CLARITY UR: CLEAR
CO2 SERPL-SCNC: 23.7 MMOL/L (ref 22–29)
COLOR UR: YELLOW
CREAT SERPL-MCNC: 1.23 MG/DL (ref 0.76–1.27)
D-LACTATE SERPL-SCNC: 3 MMOL/L (ref 0.5–2)
D-LACTATE SERPL-SCNC: 3.5 MMOL/L (ref 0.5–2)
D-LACTATE SERPL-SCNC: 3.6 MMOL/L (ref 0.5–2)
D-LACTATE SERPL-SCNC: 4.2 MMOL/L (ref 0.5–2)
DEPRECATED RDW RBC AUTO: 35.3 FL (ref 37–54)
EGFRCR SERPLBLD CKD-EPI 2021: 67.2 ML/MIN/1.73
EOSINOPHIL # BLD AUTO: 0.13 10*3/MM3 (ref 0–0.4)
EOSINOPHIL NFR BLD AUTO: 1.1 % (ref 0.3–6.2)
ERYTHROCYTE [DISTWIDTH] IN BLOOD BY AUTOMATED COUNT: 11.7 % (ref 12.3–15.4)
GLOBULIN UR ELPH-MCNC: 3.1 GM/DL
GLUCOSE BLDC GLUCOMTR-MCNC: 164 MG/DL (ref 70–99)
GLUCOSE BLDC GLUCOMTR-MCNC: 248 MG/DL (ref 70–99)
GLUCOSE SERPL-MCNC: 153 MG/DL (ref 65–99)
GLUCOSE UR STRIP-MCNC: NEGATIVE MG/DL
HCT VFR BLD AUTO: 48.7 % (ref 37.5–51)
HGB BLD-MCNC: 16.8 G/DL (ref 13–17.7)
HGB UR QL STRIP.AUTO: NEGATIVE
HOLD SPECIMEN: NORMAL
HOLD SPECIMEN: NORMAL
IMM GRANULOCYTES # BLD AUTO: 0.06 10*3/MM3 (ref 0–0.05)
IMM GRANULOCYTES NFR BLD AUTO: 0.5 % (ref 0–0.5)
KETONES UR QL STRIP: ABNORMAL
LEUKOCYTE ESTERASE UR QL STRIP.AUTO: NEGATIVE
LIPASE SERPL-CCNC: 26 U/L (ref 13–60)
LYMPHOCYTES # BLD AUTO: 1.59 10*3/MM3 (ref 0.7–3.1)
LYMPHOCYTES NFR BLD AUTO: 13 % (ref 19.6–45.3)
MCH RBC QN AUTO: 28.7 PG (ref 26.6–33)
MCHC RBC AUTO-ENTMCNC: 34.5 G/DL (ref 31.5–35.7)
MCV RBC AUTO: 83.1 FL (ref 79–97)
MONOCYTES # BLD AUTO: 0.91 10*3/MM3 (ref 0.1–0.9)
MONOCYTES NFR BLD AUTO: 7.4 % (ref 5–12)
NEUTROPHILS NFR BLD AUTO: 77.8 % (ref 42.7–76)
NEUTROPHILS NFR BLD AUTO: 9.54 10*3/MM3 (ref 1.7–7)
NITRITE UR QL STRIP: NEGATIVE
NRBC BLD AUTO-RTO: 0 /100 WBC (ref 0–0.2)
PH UR STRIP.AUTO: 6.5 [PH] (ref 5–8)
PLATELET # BLD AUTO: 253 10*3/MM3 (ref 140–450)
PMV BLD AUTO: 9.2 FL (ref 6–12)
POTASSIUM SERPL-SCNC: 5 MMOL/L (ref 3.5–5.2)
PROT SERPL-MCNC: 7.5 G/DL (ref 6–8.5)
PROT UR QL STRIP: ABNORMAL
RBC # BLD AUTO: 5.86 10*6/MM3 (ref 4.14–5.8)
SODIUM SERPL-SCNC: 138 MMOL/L (ref 136–145)
SP GR UR STRIP: 1.02 (ref 1–1.03)
TROPONIN T SERPL HS-MCNC: 9 NG/L
UROBILINOGEN UR QL STRIP: ABNORMAL
VALPROATE SERPL-MCNC: 45.5 MCG/ML (ref 50–125)
WBC NRBC COR # BLD AUTO: 12.26 10*3/MM3 (ref 3.4–10.8)
WHOLE BLOOD HOLD COAG: NORMAL
WHOLE BLOOD HOLD SPECIMEN: NORMAL

## 2024-01-01 PROCEDURE — 83690 ASSAY OF LIPASE: CPT

## 2024-01-01 PROCEDURE — 99254 IP/OBS CNSLTJ NEW/EST MOD 60: CPT | Performed by: INTERNAL MEDICINE

## 2024-01-01 PROCEDURE — 25010000002 METHYLPREDNISOLONE PER 40 MG: Performed by: INTERNAL MEDICINE

## 2024-01-01 PROCEDURE — 25010000002 DICYCLOMINE PER 20 MG

## 2024-01-01 PROCEDURE — 99285 EMERGENCY DEPT VISIT HI MDM: CPT

## 2024-01-01 PROCEDURE — 81003 URINALYSIS AUTO W/O SCOPE: CPT | Performed by: EMERGENCY MEDICINE

## 2024-01-01 PROCEDURE — 25010000002 LEVOFLOXACIN PER 250 MG: Performed by: INTERNAL MEDICINE

## 2024-01-01 PROCEDURE — 93010 ELECTROCARDIOGRAM REPORT: CPT | Performed by: INTERNAL MEDICINE

## 2024-01-01 PROCEDURE — 99223 1ST HOSP IP/OBS HIGH 75: CPT | Performed by: INTERNAL MEDICINE

## 2024-01-01 PROCEDURE — 74018 RADEX ABDOMEN 1 VIEW: CPT

## 2024-01-01 PROCEDURE — 25810000003 LACTATED RINGERS SOLUTION: Performed by: INTERNAL MEDICINE

## 2024-01-01 PROCEDURE — 25010000002 PROCHLORPERAZINE 10 MG/2ML SOLUTION: Performed by: INTERNAL MEDICINE

## 2024-01-01 PROCEDURE — 99221 1ST HOSP IP/OBS SF/LOW 40: CPT | Performed by: SURGERY

## 2024-01-01 PROCEDURE — 25010000002 KETOROLAC TROMETHAMINE PER 15 MG: Performed by: EMERGENCY MEDICINE

## 2024-01-01 PROCEDURE — 63710000001 INSULIN REGULAR HUMAN PER 5 UNITS: Performed by: INTERNAL MEDICINE

## 2024-01-01 PROCEDURE — 25010000002 METRONIDAZOLE 500 MG/100ML SOLUTION: Performed by: INTERNAL MEDICINE

## 2024-01-01 PROCEDURE — 25810000003 SODIUM CHLORIDE 0.9 % SOLUTION

## 2024-01-01 PROCEDURE — 25010000002 KETOROLAC TROMETHAMINE PER 15 MG: Performed by: PHYSICIAN ASSISTANT

## 2024-01-01 PROCEDURE — 74176 CT ABD & PELVIS W/O CONTRAST: CPT

## 2024-01-01 PROCEDURE — 80164 ASSAY DIPROPYLACETIC ACD TOT: CPT | Performed by: INTERNAL MEDICINE

## 2024-01-01 PROCEDURE — 87040 BLOOD CULTURE FOR BACTERIA: CPT | Performed by: EMERGENCY MEDICINE

## 2024-01-01 PROCEDURE — 25010000002 ONDANSETRON PER 1 MG

## 2024-01-01 PROCEDURE — 93005 ELECTROCARDIOGRAM TRACING: CPT

## 2024-01-01 PROCEDURE — 25010000002 ACETAMINOPHEN 10 MG/ML SOLUTION: Performed by: PHYSICIAN ASSISTANT

## 2024-01-01 PROCEDURE — 93005 ELECTROCARDIOGRAM TRACING: CPT | Performed by: EMERGENCY MEDICINE

## 2024-01-01 PROCEDURE — 25810000003 SODIUM CHLORIDE 0.9 % SOLUTION: Performed by: EMERGENCY MEDICINE

## 2024-01-01 PROCEDURE — 51798 US URINE CAPACITY MEASURE: CPT

## 2024-01-01 PROCEDURE — 36415 COLL VENOUS BLD VENIPUNCTURE: CPT | Performed by: EMERGENCY MEDICINE

## 2024-01-01 PROCEDURE — 25810000003 LACTATED RINGERS PER 1000 ML: Performed by: INTERNAL MEDICINE

## 2024-01-01 PROCEDURE — 83605 ASSAY OF LACTIC ACID: CPT | Performed by: EMERGENCY MEDICINE

## 2024-01-01 PROCEDURE — 85025 COMPLETE CBC W/AUTO DIFF WBC: CPT | Performed by: EMERGENCY MEDICINE

## 2024-01-01 PROCEDURE — 25010000002 ONDANSETRON PER 1 MG: Performed by: INTERNAL MEDICINE

## 2024-01-01 PROCEDURE — 25010000002 ONDANSETRON PER 1 MG: Performed by: EMERGENCY MEDICINE

## 2024-01-01 PROCEDURE — 84484 ASSAY OF TROPONIN QUANT: CPT

## 2024-01-01 PROCEDURE — 82948 REAGENT STRIP/BLOOD GLUCOSE: CPT

## 2024-01-01 PROCEDURE — 80053 COMPREHEN METABOLIC PANEL: CPT

## 2024-01-01 RX ORDER — AMOXICILLIN 250 MG
2 CAPSULE ORAL 2 TIMES DAILY
Status: DISCONTINUED | OUTPATIENT
Start: 2024-01-01 | End: 2024-01-01

## 2024-01-01 RX ORDER — BISACODYL 5 MG/1
5 TABLET, DELAYED RELEASE ORAL DAILY PRN
Status: DISCONTINUED | OUTPATIENT
Start: 2024-01-01 | End: 2024-01-01

## 2024-01-01 RX ORDER — ONDANSETRON 2 MG/ML
4 INJECTION INTRAMUSCULAR; INTRAVENOUS EVERY 6 HOURS PRN
Status: DISCONTINUED | OUTPATIENT
Start: 2024-01-01 | End: 2024-01-05 | Stop reason: HOSPADM

## 2024-01-01 RX ORDER — BACLOFEN 20 MG/1
20 TABLET ORAL 3 TIMES DAILY
COMMUNITY

## 2024-01-01 RX ORDER — AMOXICILLIN 250 MG
2 CAPSULE ORAL 2 TIMES DAILY
Status: DISCONTINUED | OUTPATIENT
Start: 2024-01-02 | End: 2024-01-04

## 2024-01-01 RX ORDER — QUETIAPINE 200 MG/1
200 TABLET, FILM COATED, EXTENDED RELEASE ORAL NIGHTLY
Status: DISCONTINUED | OUTPATIENT
Start: 2024-01-01 | End: 2024-01-02

## 2024-01-01 RX ORDER — POLYETHYLENE GLYCOL 3350 17 G/17G
17 POWDER, FOR SOLUTION ORAL DAILY PRN
Status: DISCONTINUED | OUTPATIENT
Start: 2024-01-01 | End: 2024-01-01

## 2024-01-01 RX ORDER — BISACODYL 10 MG
10 SUPPOSITORY, RECTAL RECTAL DAILY PRN
Status: DISCONTINUED | OUTPATIENT
Start: 2024-01-01 | End: 2024-01-01

## 2024-01-01 RX ORDER — KETOROLAC TROMETHAMINE 30 MG/ML
15 INJECTION, SOLUTION INTRAMUSCULAR; INTRAVENOUS EVERY 6 HOURS PRN
Status: COMPLETED | OUTPATIENT
Start: 2024-01-01 | End: 2024-01-02

## 2024-01-01 RX ORDER — MULTIPLE VITAMINS W/ MINERALS TAB 9MG-400MCG
1 TAB ORAL DAILY
Status: DISCONTINUED | OUTPATIENT
Start: 2024-01-01 | End: 2024-01-01

## 2024-01-01 RX ORDER — DICYCLOMINE HYDROCHLORIDE 10 MG/ML
20 INJECTION INTRAMUSCULAR ONCE
Status: COMPLETED | OUTPATIENT
Start: 2024-01-01 | End: 2024-01-01

## 2024-01-01 RX ORDER — SODIUM CHLORIDE 0.9 % (FLUSH) 0.9 %
10 SYRINGE (ML) INJECTION EVERY 12 HOURS SCHEDULED
Status: DISCONTINUED | OUTPATIENT
Start: 2024-01-01 | End: 2024-01-05 | Stop reason: HOSPADM

## 2024-01-01 RX ORDER — SODIUM CHLORIDE 0.9 % (FLUSH) 0.9 %
10 SYRINGE (ML) INJECTION AS NEEDED
Status: DISCONTINUED | OUTPATIENT
Start: 2024-01-01 | End: 2024-01-01

## 2024-01-01 RX ORDER — SODIUM CHLORIDE 0.9 % (FLUSH) 0.9 %
10 SYRINGE (ML) INJECTION AS NEEDED
Status: DISCONTINUED | OUTPATIENT
Start: 2024-01-01 | End: 2024-01-05 | Stop reason: HOSPADM

## 2024-01-01 RX ORDER — SODIUM CHLORIDE, SODIUM LACTATE, POTASSIUM CHLORIDE, CALCIUM CHLORIDE 600; 310; 30; 20 MG/100ML; MG/100ML; MG/100ML; MG/100ML
100 INJECTION, SOLUTION INTRAVENOUS CONTINUOUS
Status: DISCONTINUED | OUTPATIENT
Start: 2024-01-01 | End: 2024-01-05 | Stop reason: HOSPADM

## 2024-01-01 RX ORDER — POLYETHYLENE GLYCOL 3350 17 G/17G
17 POWDER, FOR SOLUTION ORAL DAILY
COMMUNITY

## 2024-01-01 RX ORDER — ACETAMINOPHEN 10 MG/ML
1000 INJECTION, SOLUTION INTRAVENOUS ONCE
Status: COMPLETED | OUTPATIENT
Start: 2024-01-01 | End: 2024-01-01

## 2024-01-01 RX ORDER — KETOCONAZOLE 20 MG/ML
1 SHAMPOO TOPICAL 2 TIMES WEEKLY
COMMUNITY

## 2024-01-01 RX ORDER — BISACODYL 10 MG
10 SUPPOSITORY, RECTAL RECTAL DAILY PRN
Status: DISCONTINUED | OUTPATIENT
Start: 2024-01-01 | End: 2024-01-04

## 2024-01-01 RX ORDER — IBUPROFEN 600 MG/1
1 TABLET ORAL
Status: DISCONTINUED | OUTPATIENT
Start: 2024-01-01 | End: 2024-01-04

## 2024-01-01 RX ORDER — KETOROLAC TROMETHAMINE 30 MG/ML
15 INJECTION, SOLUTION INTRAMUSCULAR; INTRAVENOUS EVERY 6 HOURS PRN
Status: DISCONTINUED | OUTPATIENT
Start: 2024-01-01 | End: 2024-01-01

## 2024-01-01 RX ORDER — NICOTINE POLACRILEX 4 MG
15 LOZENGE BUCCAL
Status: DISCONTINUED | OUTPATIENT
Start: 2024-01-01 | End: 2024-01-04

## 2024-01-01 RX ORDER — ONDANSETRON 2 MG/ML
4 INJECTION INTRAMUSCULAR; INTRAVENOUS ONCE
Status: COMPLETED | OUTPATIENT
Start: 2024-01-01 | End: 2024-01-01

## 2024-01-01 RX ORDER — CHOLECALCIFEROL (VITAMIN D3) 125 MCG
5 CAPSULE ORAL NIGHTLY PRN
Status: DISCONTINUED | OUTPATIENT
Start: 2024-01-01 | End: 2024-01-04

## 2024-01-01 RX ORDER — LEVOFLOXACIN 5 MG/ML
750 INJECTION, SOLUTION INTRAVENOUS EVERY 24 HOURS
Status: DISCONTINUED | OUTPATIENT
Start: 2024-01-01 | End: 2024-01-03

## 2024-01-01 RX ORDER — PANTOPRAZOLE SODIUM 40 MG/10ML
40 INJECTION, POWDER, LYOPHILIZED, FOR SOLUTION INTRAVENOUS
Status: DISCONTINUED | OUTPATIENT
Start: 2024-01-02 | End: 2024-01-05 | Stop reason: HOSPADM

## 2024-01-01 RX ORDER — LIDOCAINE 50 MG/G
1 PATCH TOPICAL EVERY 24 HOURS
COMMUNITY

## 2024-01-01 RX ORDER — SODIUM CHLORIDE 9 MG/ML
40 INJECTION, SOLUTION INTRAVENOUS AS NEEDED
Status: DISCONTINUED | OUTPATIENT
Start: 2024-01-01 | End: 2024-01-05 | Stop reason: HOSPADM

## 2024-01-01 RX ORDER — METRONIDAZOLE 500 MG/100ML
500 INJECTION, SOLUTION INTRAVENOUS EVERY 8 HOURS
Status: DISCONTINUED | OUTPATIENT
Start: 2024-01-01 | End: 2024-01-05 | Stop reason: HOSPADM

## 2024-01-01 RX ORDER — MULTIVIT AND MINERALS-FERROUS GLUCONATE 9 MG IRON/15 ML ORAL LIQUID 9 MG/15 ML
15 LIQUID (ML) ORAL DAILY
Status: DISCONTINUED | OUTPATIENT
Start: 2024-01-02 | End: 2024-01-04

## 2024-01-01 RX ORDER — FLUTICASONE PROPIONATE 50 MCG
2 SPRAY, SUSPENSION (ML) NASAL DAILY
Status: DISCONTINUED | OUTPATIENT
Start: 2024-01-01 | End: 2024-01-05 | Stop reason: HOSPADM

## 2024-01-01 RX ORDER — PROCHLORPERAZINE EDISYLATE 5 MG/ML
5 INJECTION INTRAMUSCULAR; INTRAVENOUS EVERY 6 HOURS PRN
Status: DISCONTINUED | OUTPATIENT
Start: 2024-01-01 | End: 2024-01-05 | Stop reason: HOSPADM

## 2024-01-01 RX ORDER — METHYLPREDNISOLONE SODIUM SUCCINATE 40 MG/ML
40 INJECTION, POWDER, LYOPHILIZED, FOR SOLUTION INTRAMUSCULAR; INTRAVENOUS DAILY
Status: DISCONTINUED | OUTPATIENT
Start: 2024-01-01 | End: 2024-01-05 | Stop reason: HOSPADM

## 2024-01-01 RX ORDER — KETOROLAC TROMETHAMINE 30 MG/ML
30 INJECTION, SOLUTION INTRAMUSCULAR; INTRAVENOUS ONCE
Status: COMPLETED | OUTPATIENT
Start: 2024-01-01 | End: 2024-01-01

## 2024-01-01 RX ORDER — POLYETHYLENE GLYCOL 3350 17 G/17G
17 POWDER, FOR SOLUTION ORAL DAILY PRN
Status: DISCONTINUED | OUTPATIENT
Start: 2024-01-01 | End: 2024-01-04

## 2024-01-01 RX ORDER — DIVALPROEX SODIUM 500 MG/1
1000 TABLET, EXTENDED RELEASE ORAL NIGHTLY
Status: DISCONTINUED | OUTPATIENT
Start: 2024-01-01 | End: 2024-01-01

## 2024-01-01 RX ORDER — PANTOPRAZOLE SODIUM 40 MG/1
40 TABLET, DELAYED RELEASE ORAL DAILY
Status: DISCONTINUED | OUTPATIENT
Start: 2024-01-01 | End: 2024-01-01

## 2024-01-01 RX ORDER — DEXTROSE MONOHYDRATE 25 G/50ML
25 INJECTION, SOLUTION INTRAVENOUS
Status: DISCONTINUED | OUTPATIENT
Start: 2024-01-01 | End: 2024-01-04

## 2024-01-01 RX ORDER — DICYCLOMINE HCL 20 MG
20 TABLET ORAL 2 TIMES DAILY
COMMUNITY

## 2024-01-01 RX ORDER — CHOLECALCIFEROL (VITAMIN D3) 125 MCG
5 CAPSULE ORAL NIGHTLY PRN
Status: DISCONTINUED | OUTPATIENT
Start: 2024-01-01 | End: 2024-01-01

## 2024-01-01 RX ORDER — FAMOTIDINE 20 MG/1
40 TABLET, FILM COATED ORAL DAILY
Status: DISCONTINUED | OUTPATIENT
Start: 2024-01-01 | End: 2024-01-01

## 2024-01-01 RX ORDER — MELATONIN
2000 DAILY
COMMUNITY

## 2024-01-01 RX ADMIN — SODIUM CHLORIDE 375 MG: 9 INJECTION, SOLUTION INTRAVENOUS at 23:49

## 2024-01-01 RX ADMIN — Medication 10 ML: at 20:13

## 2024-01-01 RX ADMIN — KETOROLAC TROMETHAMINE 30 MG: 30 INJECTION, SOLUTION INTRAMUSCULAR; INTRAVENOUS at 05:44

## 2024-01-01 RX ADMIN — PROCHLORPERAZINE EDISYLATE 5 MG: 5 INJECTION INTRAMUSCULAR; INTRAVENOUS at 12:46

## 2024-01-01 RX ADMIN — ONDANSETRON 4 MG: 2 INJECTION INTRAMUSCULAR; INTRAVENOUS at 05:47

## 2024-01-01 RX ADMIN — KETOROLAC TROMETHAMINE 15 MG: 30 INJECTION, SOLUTION INTRAMUSCULAR; INTRAVENOUS at 20:10

## 2024-01-01 RX ADMIN — SODIUM CHLORIDE, POTASSIUM CHLORIDE, SODIUM LACTATE AND CALCIUM CHLORIDE 1000 ML: 600; 310; 30; 20 INJECTION, SOLUTION INTRAVENOUS at 18:55

## 2024-01-01 RX ADMIN — DICYCLOMINE HYDROCHLORIDE 20 MG: 20 INJECTION, SOLUTION INTRAMUSCULAR at 08:20

## 2024-01-01 RX ADMIN — PROCHLORPERAZINE EDISYLATE 5 MG: 5 INJECTION INTRAMUSCULAR; INTRAVENOUS at 20:54

## 2024-01-01 RX ADMIN — LEVOFLOXACIN 750 MG: 750 INJECTION, SOLUTION INTRAVENOUS at 16:09

## 2024-01-01 RX ADMIN — METHYLPREDNISOLONE SODIUM SUCCINATE 40 MG: 40 INJECTION INTRAMUSCULAR; INTRAVENOUS at 12:04

## 2024-01-01 RX ADMIN — METRONIDAZOLE 500 MG: 500 INJECTION, SOLUTION INTRAVENOUS at 20:12

## 2024-01-01 RX ADMIN — SODIUM CHLORIDE 40 ML: 9 INJECTION, SOLUTION INTRAVENOUS at 23:48

## 2024-01-01 RX ADMIN — ONDANSETRON 4 MG: 2 INJECTION INTRAMUSCULAR; INTRAVENOUS at 20:11

## 2024-01-01 RX ADMIN — ACETAMINOPHEN 1000 MG: 1000 INJECTION, SOLUTION INTRAVENOUS at 20:31

## 2024-01-01 RX ADMIN — METRONIDAZOLE 500 MG: 500 INJECTION, SOLUTION INTRAVENOUS at 15:04

## 2024-01-01 RX ADMIN — PANTOPRAZOLE SODIUM 40 MG: 40 TABLET, DELAYED RELEASE ORAL at 15:04

## 2024-01-01 RX ADMIN — SODIUM CHLORIDE 500 ML: 9 INJECTION, SOLUTION INTRAVENOUS at 05:47

## 2024-01-01 RX ADMIN — SODIUM CHLORIDE 1000 ML: 9 INJECTION, SOLUTION INTRAVENOUS at 08:16

## 2024-01-01 RX ADMIN — INSULIN HUMAN 3 UNITS: 100 INJECTION, SOLUTION PARENTERAL at 17:57

## 2024-01-01 RX ADMIN — ONDANSETRON 4 MG: 2 INJECTION INTRAMUSCULAR; INTRAVENOUS at 08:18

## 2024-01-01 RX ADMIN — ONDANSETRON 4 MG: 2 INJECTION INTRAMUSCULAR; INTRAVENOUS at 12:04

## 2024-01-01 RX ADMIN — SODIUM CHLORIDE, POTASSIUM CHLORIDE, SODIUM LACTATE AND CALCIUM CHLORIDE 150 ML/HR: 600; 310; 30; 20 INJECTION, SOLUTION INTRAVENOUS at 20:12

## 2024-01-01 RX ADMIN — FLUTICASONE PROPIONATE 2 SPRAY: 50 SPRAY, METERED NASAL at 15:04

## 2024-01-01 NOTE — LETTER
January 5, 2024     Patient: Chai Gaston   YOB: 1965   Date of Visit: 1/1/2024       Please excuse Jocelyn Gaston as she was with her spouse, Chai Gaston, while he was hospitalized from 1/1/24 to 1/5/24.           Sincerely,    Bria Rivera MD

## 2024-01-01 NOTE — LETTER
January 5, 2024     Patient: Chai Gaston   YOB: 1965   Date of Visit: 1/1/2024       To Whom It May Concern:    Please excuse Mr. Chai Gaston as he was hospitalized from 1/1/2024 through 1/5/2024.            Sincerely,        Ebenezer Chase RN
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
## 2024-01-01 NOTE — PLAN OF CARE
Goal Outcome Evaluation: NG insertion 16 fr to intermittent suction. Nausea decreased after admin of compazine. Wife at bedside. Resting in bed with eyes closed at this time.
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
## 2024-01-02 ENCOUNTER — APPOINTMENT (OUTPATIENT)
Dept: GENERAL RADIOLOGY | Facility: HOSPITAL | Age: 59
DRG: 389 | End: 2024-01-02
Payer: OTHER GOVERNMENT

## 2024-01-02 LAB
ALBUMIN SERPL-MCNC: 3.7 G/DL (ref 3.5–5.2)
ALBUMIN/GLOB SERPL: 1.4 G/DL
ALP SERPL-CCNC: 50 U/L (ref 39–117)
ALT SERPL W P-5'-P-CCNC: 27 U/L (ref 1–41)
ANION GAP SERPL CALCULATED.3IONS-SCNC: 12.2 MMOL/L (ref 5–15)
ANION GAP SERPL CALCULATED.3IONS-SCNC: 12.7 MMOL/L (ref 5–15)
AST SERPL-CCNC: 26 U/L (ref 1–40)
BASOPHILS # BLD AUTO: 0.04 10*3/MM3 (ref 0–0.2)
BASOPHILS NFR BLD AUTO: 0.5 % (ref 0–1.5)
BILIRUB SERPL-MCNC: 0.5 MG/DL (ref 0–1.2)
BUN SERPL-MCNC: 19 MG/DL (ref 8–23)
BUN SERPL-MCNC: 20 MG/DL (ref 8–23)
BUN/CREAT SERPL: 14.4 (ref 7–25)
BUN/CREAT SERPL: 15.3 (ref 7–25)
CALCIUM SPEC-SCNC: 8.8 MG/DL (ref 8.6–10.5)
CALCIUM SPEC-SCNC: 9.1 MG/DL (ref 8.6–10.5)
CHLORIDE SERPL-SCNC: 102 MMOL/L (ref 98–107)
CHLORIDE SERPL-SCNC: 103 MMOL/L (ref 98–107)
CO2 SERPL-SCNC: 18.3 MMOL/L (ref 22–29)
CO2 SERPL-SCNC: 22.8 MMOL/L (ref 22–29)
CREAT SERPL-MCNC: 1.24 MG/DL (ref 0.76–1.27)
CREAT SERPL-MCNC: 1.39 MG/DL (ref 0.76–1.27)
D-LACTATE SERPL-SCNC: 2.4 MMOL/L (ref 0.5–2)
D-LACTATE SERPL-SCNC: 4.1 MMOL/L (ref 0.5–2)
DEPRECATED RDW RBC AUTO: 36 FL (ref 37–54)
EGFRCR SERPLBLD CKD-EPI 2021: 58 ML/MIN/1.73
EGFRCR SERPLBLD CKD-EPI 2021: 66.6 ML/MIN/1.73
EOSINOPHIL # BLD AUTO: 0.07 10*3/MM3 (ref 0–0.4)
EOSINOPHIL NFR BLD AUTO: 0.8 % (ref 0.3–6.2)
ERYTHROCYTE [DISTWIDTH] IN BLOOD BY AUTOMATED COUNT: 11.9 % (ref 12.3–15.4)
GLOBULIN UR ELPH-MCNC: 2.7 GM/DL
GLUCOSE BLDC GLUCOMTR-MCNC: 148 MG/DL (ref 70–99)
GLUCOSE BLDC GLUCOMTR-MCNC: 151 MG/DL (ref 70–99)
GLUCOSE SERPL-MCNC: 144 MG/DL (ref 65–99)
GLUCOSE SERPL-MCNC: 159 MG/DL (ref 65–99)
HBA1C MFR BLD: 7 % (ref 4.8–5.6)
HCT VFR BLD AUTO: 44.1 % (ref 37.5–51)
HGB BLD-MCNC: 14.8 G/DL (ref 13–17.7)
IMM GRANULOCYTES # BLD AUTO: 0.02 10*3/MM3 (ref 0–0.05)
IMM GRANULOCYTES NFR BLD AUTO: 0.2 % (ref 0–0.5)
LYMPHOCYTES # BLD AUTO: 1.23 10*3/MM3 (ref 0.7–3.1)
LYMPHOCYTES NFR BLD AUTO: 14.8 % (ref 19.6–45.3)
MAGNESIUM SERPL-MCNC: 1.9 MG/DL (ref 1.6–2.4)
MCH RBC QN AUTO: 28.4 PG (ref 26.6–33)
MCHC RBC AUTO-ENTMCNC: 33.6 G/DL (ref 31.5–35.7)
MCV RBC AUTO: 84.6 FL (ref 79–97)
MONOCYTES # BLD AUTO: 1.18 10*3/MM3 (ref 0.1–0.9)
MONOCYTES NFR BLD AUTO: 14.2 % (ref 5–12)
NEUTROPHILS NFR BLD AUTO: 5.76 10*3/MM3 (ref 1.7–7)
NEUTROPHILS NFR BLD AUTO: 69.5 % (ref 42.7–76)
NRBC BLD AUTO-RTO: 0 /100 WBC (ref 0–0.2)
PHOSPHATE SERPL-MCNC: 3.1 MG/DL (ref 2.5–4.5)
PLATELET # BLD AUTO: 195 10*3/MM3 (ref 140–450)
PMV BLD AUTO: 9.2 FL (ref 6–12)
POTASSIUM SERPL-SCNC: 4.6 MMOL/L (ref 3.5–5.2)
POTASSIUM SERPL-SCNC: 5.4 MMOL/L (ref 3.5–5.2)
PROT SERPL-MCNC: 6.4 G/DL (ref 6–8.5)
QT INTERVAL: 295 MS
QTC INTERVAL: 433 MS
RBC # BLD AUTO: 5.21 10*6/MM3 (ref 4.14–5.8)
SODIUM SERPL-SCNC: 134 MMOL/L (ref 136–145)
SODIUM SERPL-SCNC: 137 MMOL/L (ref 136–145)
WBC NRBC COR # BLD AUTO: 8.3 10*3/MM3 (ref 3.4–10.8)

## 2024-01-02 PROCEDURE — 83036 HEMOGLOBIN GLYCOSYLATED A1C: CPT | Performed by: PHYSICIAN ASSISTANT

## 2024-01-02 PROCEDURE — 83735 ASSAY OF MAGNESIUM: CPT | Performed by: INTERNAL MEDICINE

## 2024-01-02 PROCEDURE — 80053 COMPREHEN METABOLIC PANEL: CPT | Performed by: INTERNAL MEDICINE

## 2024-01-02 PROCEDURE — 83605 ASSAY OF LACTIC ACID: CPT | Performed by: EMERGENCY MEDICINE

## 2024-01-02 PROCEDURE — 99231 SBSQ HOSP IP/OBS SF/LOW 25: CPT | Performed by: SURGERY

## 2024-01-02 PROCEDURE — 25010000002 METHYLPREDNISOLONE PER 40 MG: Performed by: INTERNAL MEDICINE

## 2024-01-02 PROCEDURE — 25010000002 KETOROLAC TROMETHAMINE PER 15 MG: Performed by: PHYSICIAN ASSISTANT

## 2024-01-02 PROCEDURE — 82948 REAGENT STRIP/BLOOD GLUCOSE: CPT

## 2024-01-02 PROCEDURE — 25810000003 LACTATED RINGERS SOLUTION: Performed by: PHYSICIAN ASSISTANT

## 2024-01-02 PROCEDURE — 0D9670Z DRAINAGE OF STOMACH WITH DRAINAGE DEVICE, VIA NATURAL OR ARTIFICIAL OPENING: ICD-10-PCS | Performed by: FAMILY MEDICINE

## 2024-01-02 PROCEDURE — 84100 ASSAY OF PHOSPHORUS: CPT | Performed by: INTERNAL MEDICINE

## 2024-01-02 PROCEDURE — 99232 SBSQ HOSP IP/OBS MODERATE 35: CPT | Performed by: INTERNAL MEDICINE

## 2024-01-02 PROCEDURE — 25010000002 ONDANSETRON PER 1 MG: Performed by: INTERNAL MEDICINE

## 2024-01-02 PROCEDURE — 74018 RADEX ABDOMEN 1 VIEW: CPT

## 2024-01-02 PROCEDURE — 63710000001 INSULIN REGULAR HUMAN PER 5 UNITS: Performed by: INTERNAL MEDICINE

## 2024-01-02 PROCEDURE — 25010000002 METRONIDAZOLE 500 MG/100ML SOLUTION: Performed by: INTERNAL MEDICINE

## 2024-01-02 PROCEDURE — 25010000002 LEVOFLOXACIN PER 250 MG: Performed by: INTERNAL MEDICINE

## 2024-01-02 PROCEDURE — 25010000002 PROCHLORPERAZINE 10 MG/2ML SOLUTION: Performed by: INTERNAL MEDICINE

## 2024-01-02 PROCEDURE — 85025 COMPLETE CBC W/AUTO DIFF WBC: CPT | Performed by: INTERNAL MEDICINE

## 2024-01-02 PROCEDURE — 25010000002 MEPERIDINE PER 100 MG: Performed by: FAMILY MEDICINE

## 2024-01-02 RX ORDER — ACETAMINOPHEN 10 MG/ML
1000 INJECTION, SOLUTION INTRAVENOUS ONCE AS NEEDED
Status: DISCONTINUED | OUTPATIENT
Start: 2024-01-02 | End: 2024-01-04

## 2024-01-02 RX ORDER — QUETIAPINE FUMARATE 100 MG/1
100 TABLET, FILM COATED ORAL 2 TIMES DAILY
Status: DISCONTINUED | OUTPATIENT
Start: 2024-01-02 | End: 2024-01-04

## 2024-01-02 RX ORDER — MEPERIDINE HYDROCHLORIDE 25 MG/ML
25 INJECTION INTRAMUSCULAR; INTRAVENOUS; SUBCUTANEOUS ONCE
Status: COMPLETED | OUTPATIENT
Start: 2024-01-03 | End: 2024-01-03

## 2024-01-02 RX ORDER — MEPERIDINE HYDROCHLORIDE 25 MG/ML
25 INJECTION INTRAMUSCULAR; INTRAVENOUS; SUBCUTANEOUS ONCE
Status: COMPLETED | OUTPATIENT
Start: 2024-01-02 | End: 2024-01-02

## 2024-01-02 RX ADMIN — SODIUM CHLORIDE 375 MG: 9 INJECTION, SOLUTION INTRAVENOUS at 06:15

## 2024-01-02 RX ADMIN — QUETIAPINE FUMARATE 100 MG: 100 TABLET ORAL at 20:44

## 2024-01-02 RX ADMIN — FLUTICASONE PROPIONATE 2 SPRAY: 50 SPRAY, METERED NASAL at 09:22

## 2024-01-02 RX ADMIN — METRONIDAZOLE 500 MG: 500 INJECTION, SOLUTION INTRAVENOUS at 11:55

## 2024-01-02 RX ADMIN — MEPERIDINE HYDROCHLORIDE 25 MG: 25 INJECTION INTRAMUSCULAR; INTRAVENOUS; SUBCUTANEOUS at 02:37

## 2024-01-02 RX ADMIN — LEVOFLOXACIN 750 MG: 750 INJECTION, SOLUTION INTRAVENOUS at 13:02

## 2024-01-02 RX ADMIN — ONDANSETRON 4 MG: 2 INJECTION INTRAMUSCULAR; INTRAVENOUS at 03:24

## 2024-01-02 RX ADMIN — SODIUM CHLORIDE, POTASSIUM CHLORIDE, SODIUM LACTATE AND CALCIUM CHLORIDE 500 ML: 600; 310; 30; 20 INJECTION, SOLUTION INTRAVENOUS at 01:53

## 2024-01-02 RX ADMIN — QUETIAPINE FUMARATE 100 MG: 100 TABLET ORAL at 09:20

## 2024-01-02 RX ADMIN — KETOROLAC TROMETHAMINE 15 MG: 30 INJECTION, SOLUTION INTRAMUSCULAR; INTRAVENOUS at 01:51

## 2024-01-02 RX ADMIN — METRONIDAZOLE 500 MG: 500 INJECTION, SOLUTION INTRAVENOUS at 06:14

## 2024-01-02 RX ADMIN — SODIUM CHLORIDE 250 MG: 9 INJECTION, SOLUTION INTRAVENOUS at 20:44

## 2024-01-02 RX ADMIN — PANTOPRAZOLE SODIUM 40 MG: 40 INJECTION, POWDER, FOR SOLUTION INTRAVENOUS at 06:15

## 2024-01-02 RX ADMIN — Medication 15 ML: at 09:20

## 2024-01-02 RX ADMIN — Medication 5 MG: at 20:44

## 2024-01-02 RX ADMIN — INSULIN HUMAN 2 UNITS: 100 INJECTION, SOLUTION PARENTERAL at 13:01

## 2024-01-02 RX ADMIN — METRONIDAZOLE 500 MG: 500 INJECTION, SOLUTION INTRAVENOUS at 20:44

## 2024-01-02 RX ADMIN — METHYLPREDNISOLONE SODIUM SUCCINATE 40 MG: 40 INJECTION INTRAMUSCULAR; INTRAVENOUS at 09:20

## 2024-01-02 RX ADMIN — PROCHLORPERAZINE EDISYLATE 5 MG: 5 INJECTION INTRAMUSCULAR; INTRAVENOUS at 20:43

## 2024-01-02 RX ADMIN — DOCUSATE SODIUM 50MG AND SENNOSIDES 8.6MG 2 TABLET: 8.6; 5 TABLET, FILM COATED ORAL at 20:44

## 2024-01-02 RX ADMIN — PROCHLORPERAZINE EDISYLATE 5 MG: 5 INJECTION INTRAMUSCULAR; INTRAVENOUS at 03:24

## 2024-01-02 RX ADMIN — Medication 10 ML: at 20:44

## 2024-01-02 RX ADMIN — SODIUM CHLORIDE 250 MG: 9 INJECTION, SOLUTION INTRAVENOUS at 15:19

## 2024-01-02 NOTE — PROGRESS NOTES
[facility]     Progress Note    Patient Name: Chai Gaston  : 1965  MRN: [id number]  Primary Care Physician:  Tatum Cruz APRN  Date of admission: 2024    Subjective   Subjective     Chief Complaint: Abdominal pain, nausea vomiting    HPI:  Patient Reports persistent abdominal pain and nausea.  No bowel movement or bowel function.      Objective   Objective     Vitals:   Temp:  [97.9 °F (36.6 °C)-98.1 °F (36.7 °C)] 97.9 °F (36.6 °C)  Heart Rate:  [110-131] 114  Resp:  [18] 18  BP: (124-152)/(77-92) 152/92    Physical Exam  Constitutional:       Appearance: Normal appearance.   Cardiovascular:      Rate and Rhythm: Normal rate.   Pulmonary:      Effort: Pulmonary effort is normal.   Abdominal:      Palpations: Abdomen is soft.      Tenderness: There is abdominal tenderness.   Skin:     General: Skin is warm.         Result Review    Result Review:  I have personally reviewed the results from the time of this admission to 2024 13:09 EST and agree with these findings:  [x]  Laboratory  []  Microbiology  []  Radiology  []  EKG/Telemetry   []  Cardiology/Vascular   []  Pathology  []  Old records  []  Other:  Most notable findings include: Mild leukocytosis; x-ray showing persistent bowel obstruction    Assessment & Plan   Assessment / Plan     Brief Patient Summary:  Chai Gaston is a 58 y.o. male who has likely bowel obstruction possibly secondary to Crohn's flare possibly secondary to mechanical adhesions    Active Hospital Problems:  Active Hospital Problems    Diagnosis     **SBO (small bowel obstruction)      Plan:  Continue n.p.o. NG tube  If fails to improve I may do oral contrast study tomorrow to see if there is progression of contrast disease small bowel  Continue supportive care  Crohn's management per GI       DVT prophylaxis:  Mechanical DVT prophylaxis orders are present.    CODE STATUS:   Code Status (Patient has no pulse and is not breathing): CPR (Attempt to  Resuscitate)  Medical Interventions (Patient has pulse or is breathing): Full Support    Disposition:  I expect patient to be discharged unsure.    Electronically signed by Julio Cesar Vivas MD, 01/02/24, 1:09 PM EST.

## 2024-01-02 NOTE — PROGRESS NOTES
[facility]  Inpatient Progress Note    Reason for Follow Up:  SBO    Subjective     Interval History:   Pt reports no bowel movements.  Still with abd pain.    Current Facility-Administered Medications:     sennosides-docusate (PERICOLACE) 8.6-50 MG per tablet 2 tablet, 2 tablet, Nasogastric, BID **AND** polyethylene glycol (MIRALAX) packet 17 g, 17 g, Nasogastric, Daily PRN **AND** [DISCONTINUED] bisacodyl (DULCOLAX) EC tablet 5 mg, 5 mg, Oral, Daily PRN **AND** bisacodyl (DULCOLAX) suppository 10 mg, 10 mg, Rectal, Daily PRN, Shyam Odonnell MD    dextrose (D50W) (25 g/50 mL) IV injection 25 g, 25 g, Intravenous, Q15 Min PRN, Shyam Odonnell MD    dextrose (GLUTOSE) oral gel 15 g, 15 g, Oral, Q15 Min PRN, Shyam Odonnell MD    fluticasone (FLONASE) 50 MCG/ACT nasal spray 2 spray, 2 spray, Nasal, Daily, Shyam Odonnell MD, 2 spray at 01/02/24 0922    glucagon (GLUCAGEN) injection 1 mg, 1 mg, Intramuscular, Q15 Min PRN, Shyam Odonnell MD    insulin regular (humuLIN R,novoLIN R) injection 2-7 Units, 2-7 Units, Subcutaneous, Q6H, Shyam Odonnell MD, 3 Units at 01/01/24 1757    lactated ringers infusion, 150 mL/hr, Intravenous, Continuous, Shyam Odonnell MD, Last Rate: 150 mL/hr at 01/01/24 2012, 150 mL/hr at 01/01/24 2012    levoFLOXacin (LEVAQUIN) 750 mg/150 mL D5W (premix) (LEVAQUIN) 750 mg, 750 mg, Intravenous, Q24H, Shyam Odonnell MD, Last Rate: 100 mL/hr at 01/01/24 1609, 750 mg at 01/01/24 1609    melatonin tablet 5 mg, 5 mg, Nasogastric, Nightly PRN, Shyam Odonnell MD    methylPREDNISolone sodium succinate (SOLU-Medrol) injection 40 mg, 40 mg, Intravenous, Daily, Shyam Odonnell MD, 40 mg at 01/02/24 0920    metroNIDAZOLE (FLAGYL) IVPB 500 mg, 500 mg, Intravenous, Q8H, Shyam Odonnell MD, Last Rate: 100 mL/hr at 01/02/24 1155, 500 mg at 01/02/24 1155    multivitamin and minerals liquid 15 mL, 15 mL,  Nasogastric, Daily, Shyam Odonnell MD, 15 mL at 01/02/24 0920    ondansetron (ZOFRAN) injection 4 mg, 4 mg, Intravenous, Q6H PRN, Shyam Odonnell MD, 4 mg at 01/02/24 0324    pantoprazole (PROTONIX) injection 40 mg, 40 mg, Intravenous, Q AM, Shyam Odonnell MD, 40 mg at 01/02/24 0615    prochlorperazine (COMPAZINE) injection 5 mg, 5 mg, Intravenous, Q6H PRN, Shyam Odonnell MD, 5 mg at 01/02/24 0324    QUEtiapine (SEROquel) tablet 100 mg, 100 mg, Nasogastric, BID, Shyam Odonnell MD, 100 mg at 01/02/24 0920    sodium chloride 0.9 % flush 10 mL, 10 mL, Intravenous, PRN, Shyam Odonnell MD    sodium chloride 0.9 % flush 10 mL, 10 mL, Intravenous, Q12H, Shyam Odonnell MD, 10 mL at 01/01/24 2013    sodium chloride 0.9 % flush 10 mL, 10 mL, Intravenous, PRN, Shyam Odonnell MD    sodium chloride 0.9 % infusion 40 mL, 40 mL, Intravenous, PRN, Shyam Odonnell MD, 40 mL at 01/01/24 2348    valproate (DEPACON) 250 mg in sodium chloride 0.9 % 50 mL IVPB, 250 mg, Intravenous, Q6H, Shyam Odonnell MD  Review of Systems:    The following systems were reviewed and negative;  constitution, respiratory, and cardiovascular    Objective     Vital Signs  Temp:  [97.9 °F (36.6 °C)-98.1 °F (36.7 °C)] 97.9 °F (36.6 °C)  Heart Rate:  [110-131] 114  Resp:  [18] 18  BP: (124-152)/(77-92) 152/92  Body mass index is 34.47 kg/m².    Intake/Output Summary (Last 24 hours) at 1/2/2024 1257  Last data filed at 1/2/2024 0900  Gross per 24 hour   Intake 2437 ml   Output 300 ml   Net 2137 ml     No intake/output data recorded.     Physical Exam:   General: awake, alert and in no acute distress   Eyes: eyes move symmetrical in all directions, no scleral icterus   Neck: supple, trachea is midline   Skin: warm and dry, not jaundiced   Cardiovascular: no chest tenderness   Pulm: breathing unlabored   Abdomen: soft, right sided TTP, distended   Rectal: deferred   Extremities: no rash  or edema   Psychiatric: mental status within normal limits      Results Review:     I reviewed the patient's new clinical results.    Results from last 7 days   Lab Units 01/02/24  0553 01/01/24  0540   WBC 10*3/mm3 8.30 12.26*   HEMOGLOBIN g/dL 14.8 16.8   HEMATOCRIT % 44.1 48.7   PLATELETS 10*3/mm3 195 253     Results from last 7 days   Lab Units 01/02/24  0553 01/01/24  0540   SODIUM mmol/L 134* 138   POTASSIUM mmol/L 5.4* 5.0   CHLORIDE mmol/L 103 99   CO2 mmol/L 18.3* 23.7   BUN mg/dL 19 11   CREATININE mg/dL 1.24 1.23   CALCIUM mg/dL 8.8 9.5   BILIRUBIN mg/dL 0.5 0.6   ALK PHOS U/L 50 69   ALT (SGPT) U/L 27 41   AST (SGOT) U/L 26 34   GLUCOSE mg/dL 144* 153*         Lab Results   Lab Value Date/Time    LIPASE 26 01/01/2024 0540    LIPASE 27 12/08/2022 1642    LIPASE 30 11/25/2022 0733    LIPASE 31 02/25/2019 2301         Assessment & Plan   Assessment:     Small bowel obstruction  History of Crohn's disease    Plan:     Continue NG tube  continue solumedrol IV  continue with levfloxacin, flagyl  Pt would likely benefit from a biologic at this point; d/w patient, and he has f[facility]    I discussed the patients findings and my recommendations with patient and family.         Autumn Ybarra M.D.  [place] [address]  [id number]  Office: [phone]

## 2024-01-02 NOTE — PLAN OF CARE
Goal Outcome Evaluation: No c/o nausea up in chair on and off through out the shift. 350 cc from NG.

## 2024-01-02 NOTE — PLAN OF CARE
Goal Outcome Evaluation:   PT CALM, COOPERATIVE. DENIES PAIN OR DISCOMFORT AT THIS TIME

## 2024-01-02 NOTE — NURSING NOTE
PT CONTINUED TO HAVE OUTPUT THROUGH NG SUCTION DURING SHIFT. C/O PAIN LEVELS AS HIGH AS 9/10 WITH NAUSEA AND DRY-HEAVING. DIFFICULTY IN MANAGING PAIN AND NAUSEA THROUGHOUT SHIFT. CONTACTED INDER WILSON. SEE ORDERS.

## 2024-01-02 NOTE — PROGRESS NOTES
[facility]   Hospitalist Progress Note  Date: 2024  Patient Name: Chai Gaston  : 1965  MRN: [id number]  Date of admission: 2024    Subjective   Subjective     Chief Complaint:   Abdominal pain    Summary:   Chai Gaston is a 58 y.o. male past medical history significant for Crohn's, multiple small bowel obstructions who presents to the hospital with a 1 day history of abdominal pain, nausea and vomiting.  Patient states that several days ago he began having diarrhea, this subsequently has stopped, patient is not passing any gas.  Patient's abdomen is, he was evaluated by the emergency department where he underwent CT scan of the abdomen which was concerning for SBO.  General surgery and gastroenterology was consulted and the hospitalist service is asked to admit for further workup and management.     Interval Followup:   Patient still complaining of abdominal pain, not passing gas or having bowel movements at this time, still has nausea and vomiting, NG tube was placed.  Patient's potassium elevated on a.m. labs and repeat pending    Objective   Objective     Vitals:   Temp:  [97.7 °F (36.5 °C)-98.1 °F (36.7 °C)] 97.9 °F (36.6 °C)  Heart Rate:  [110-131] 110  Resp:  [18-22] 18  BP: (124-143)/(77-84) 124/82    Physical Exam   GEN: No acute distress  HEENT: Moist mucous membranes  LUNGS: Equal chest rise bilaterally  CARDIAC: Regular rate and rhythm  NEURO: Moving all 4 extremities spontaneously  SKIN: No obvious breakdown    Result Review    Result Review:  I have personally reviewed the results as below  [x]  Laboratory CBC, CMP personally reviewed  CBC          2024    05:40 2024    05:53   CBC   WBC 12.26  8.30    RBC 5.86  5.21    Hemoglobin 16.8  14.8    Hematocrit 48.7  44.1    MCV 83.1  84.6    MCH 28.7  28.4    MCHC 34.5  33.6    RDW 11.7  11.9    Platelets 253  195      CMP          2024    05:40 2024    05:53   CMP   Glucose 153  144    BUN 11  19     Creatinine 1.23  1.24    EGFR 67.2  66.6    Sodium 138  134    Potassium 5.0  5.4    Chloride 99  103    Calcium 9.5  8.8    Total Protein 7.5  6.4    Albumin 4.4  3.7    Globulin 3.1  2.7    Total Bilirubin 0.6  0.5    Alkaline Phosphatase 69  50    AST (SGOT) 34  26    ALT (SGPT) 41  27    Albumin/Globulin Ratio 1.4  1.4    BUN/Creatinine Ratio 8.9  15.3    Anion Gap 15.3  12.7      []  Microbiology  []  Radiology  []  EKG/Telemetry   []  Cardiology/Vascular   []  Pathology  []  Old records  []  Other:    Assessment & Plan   Assessment / Plan     Assessment:  Small bowel obstruction  History of Crohn's disease  Depression  Diabetes mellitus  Hyperlipidemia  Hypertriglyceridemia  History of MI  PTSD  Multiple abdominal surgeries  Hyperkalemia  Lactic acidosis     Plan:  Patient admitted to the hospital for further workup and management of above  Patient started on Solu-Medrol, Levaquin and Flagyl  Continue n.p.o. status, continue bowel rest  Continue NG tube  Continue sliding-scale insulin Accu-Chek every 6 hours as patient is n.p.o.  General surgery consulted, discussed management plan with Dr. Vivas 1/2/2024  Gastroenterology consulted, discussed management plan with Dr. Ybarra 1/2/2024   CBC, CMP reviewed 1/2/2024   Repeat CBC, CMP, mag and Phos in a.m. 1/2/2024   CT scan of the abdomen and pelvis personally reviewed, consistent with small bowel obstruction  Resume appropriate home medications once reconciled, can clamp the tube and give medicines as needed  Receiving Demerol for pain, IV Tylenol  Repeat BMP given hyperkalemia on a.m. labs  Continue IV fluids, lactic acid improving     Reviewed patients labs and imaging, and discussed with patient and nurse at bedside.    DVT prophylaxis:  Mechanical DVT prophylaxis orders are present.    CODE STATUS:   Code Status (Patient has no pulse and is not breathing): CPR (Attempt to Resuscitate)  Medical Interventions (Patient has pulse or is breathing): Full  Support        Electronically signed by Shyam Odonnell MD, 01/02/24, 11:32 AM EST.

## 2024-01-03 ENCOUNTER — APPOINTMENT (OUTPATIENT)
Dept: CT IMAGING | Facility: HOSPITAL | Age: 59
DRG: 389 | End: 2024-01-03
Payer: OTHER GOVERNMENT

## 2024-01-03 LAB
ALBUMIN SERPL-MCNC: 3.6 G/DL (ref 3.5–5.2)
ALBUMIN/GLOB SERPL: 1.4 G/DL
ALP SERPL-CCNC: 43 U/L (ref 39–117)
ALT SERPL W P-5'-P-CCNC: 20 U/L (ref 1–41)
ANION GAP SERPL CALCULATED.3IONS-SCNC: 11.1 MMOL/L (ref 5–15)
AST SERPL-CCNC: 19 U/L (ref 1–40)
BASOPHILS # BLD AUTO: 0.01 10*3/MM3 (ref 0–0.2)
BASOPHILS NFR BLD AUTO: 0.2 % (ref 0–1.5)
BILIRUB SERPL-MCNC: 0.4 MG/DL (ref 0–1.2)
BUN SERPL-MCNC: 21 MG/DL (ref 8–23)
BUN/CREAT SERPL: 16.5 (ref 7–25)
CALCIUM SPEC-SCNC: 8.6 MG/DL (ref 8.6–10.5)
CHLORIDE SERPL-SCNC: 103 MMOL/L (ref 98–107)
CO2 SERPL-SCNC: 21.9 MMOL/L (ref 22–29)
CREAT SERPL-MCNC: 1.27 MG/DL (ref 0.76–1.27)
DEPRECATED RDW RBC AUTO: 38.1 FL (ref 37–54)
EGFRCR SERPLBLD CKD-EPI 2021: 64.7 ML/MIN/1.73
EOSINOPHIL # BLD AUTO: 0 10*3/MM3 (ref 0–0.4)
EOSINOPHIL NFR BLD AUTO: 0 % (ref 0.3–6.2)
ERYTHROCYTE [DISTWIDTH] IN BLOOD BY AUTOMATED COUNT: 12.2 % (ref 12.3–15.4)
GLOBULIN UR ELPH-MCNC: 2.5 GM/DL
GLUCOSE BLDC GLUCOMTR-MCNC: 115 MG/DL (ref 70–99)
GLUCOSE BLDC GLUCOMTR-MCNC: 116 MG/DL (ref 70–99)
GLUCOSE BLDC GLUCOMTR-MCNC: 119 MG/DL (ref 70–99)
GLUCOSE BLDC GLUCOMTR-MCNC: 129 MG/DL (ref 70–99)
GLUCOSE BLDC GLUCOMTR-MCNC: 168 MG/DL (ref 70–99)
GLUCOSE SERPL-MCNC: 137 MG/DL (ref 65–99)
HCT VFR BLD AUTO: 42.6 % (ref 37.5–51)
HGB BLD-MCNC: 14.2 G/DL (ref 13–17.7)
IMM GRANULOCYTES # BLD AUTO: 0.01 10*3/MM3 (ref 0–0.05)
IMM GRANULOCYTES NFR BLD AUTO: 0.2 % (ref 0–0.5)
LYMPHOCYTES # BLD AUTO: 1.57 10*3/MM3 (ref 0.7–3.1)
LYMPHOCYTES NFR BLD AUTO: 23.8 % (ref 19.6–45.3)
MAGNESIUM SERPL-MCNC: 1.7 MG/DL (ref 1.6–2.4)
MCH RBC QN AUTO: 28.6 PG (ref 26.6–33)
MCHC RBC AUTO-ENTMCNC: 33.3 G/DL (ref 31.5–35.7)
MCV RBC AUTO: 85.7 FL (ref 79–97)
MONOCYTES # BLD AUTO: 1.06 10*3/MM3 (ref 0.1–0.9)
MONOCYTES NFR BLD AUTO: 16.1 % (ref 5–12)
NEUTROPHILS NFR BLD AUTO: 3.94 10*3/MM3 (ref 1.7–7)
NEUTROPHILS NFR BLD AUTO: 59.7 % (ref 42.7–76)
NRBC BLD AUTO-RTO: 0 /100 WBC (ref 0–0.2)
PHOSPHATE SERPL-MCNC: 3.7 MG/DL (ref 2.5–4.5)
PLATELET # BLD AUTO: 195 10*3/MM3 (ref 140–450)
PMV BLD AUTO: 9 FL (ref 6–12)
POTASSIUM SERPL-SCNC: 4.2 MMOL/L (ref 3.5–5.2)
PROT SERPL-MCNC: 6.1 G/DL (ref 6–8.5)
RBC # BLD AUTO: 4.97 10*6/MM3 (ref 4.14–5.8)
SODIUM SERPL-SCNC: 136 MMOL/L (ref 136–145)
WBC NRBC COR # BLD AUTO: 6.59 10*3/MM3 (ref 3.4–10.8)

## 2024-01-03 PROCEDURE — 80053 COMPREHEN METABOLIC PANEL: CPT | Performed by: INTERNAL MEDICINE

## 2024-01-03 PROCEDURE — 25010000002 ONDANSETRON PER 1 MG: Performed by: INTERNAL MEDICINE

## 2024-01-03 PROCEDURE — 82948 REAGENT STRIP/BLOOD GLUCOSE: CPT

## 2024-01-03 PROCEDURE — 85025 COMPLETE CBC W/AUTO DIFF WBC: CPT | Performed by: INTERNAL MEDICINE

## 2024-01-03 PROCEDURE — 25010000002 LEVOFLOXACIN PER 250 MG: Performed by: INTERNAL MEDICINE

## 2024-01-03 PROCEDURE — 99232 SBSQ HOSP IP/OBS MODERATE 35: CPT | Performed by: INTERNAL MEDICINE

## 2024-01-03 PROCEDURE — 74177 CT ABD & PELVIS W/CONTRAST: CPT

## 2024-01-03 PROCEDURE — 25510000001 IOPAMIDOL PER 1 ML: Performed by: INTERNAL MEDICINE

## 2024-01-03 PROCEDURE — 25010000002 PROCHLORPERAZINE 10 MG/2ML SOLUTION: Performed by: INTERNAL MEDICINE

## 2024-01-03 PROCEDURE — 25010000002 MEPERIDINE PER 100 MG: Performed by: STUDENT IN AN ORGANIZED HEALTH CARE EDUCATION/TRAINING PROGRAM

## 2024-01-03 PROCEDURE — 0 IOHEXOL 12 MG/ML SOLUTION: Performed by: NURSE PRACTITIONER

## 2024-01-03 PROCEDURE — 25810000003 LACTATED RINGERS PER 1000 ML: Performed by: INTERNAL MEDICINE

## 2024-01-03 PROCEDURE — 83735 ASSAY OF MAGNESIUM: CPT | Performed by: INTERNAL MEDICINE

## 2024-01-03 PROCEDURE — 99231 SBSQ HOSP IP/OBS SF/LOW 25: CPT | Performed by: SURGERY

## 2024-01-03 PROCEDURE — 25010000002 METHYLPREDNISOLONE PER 40 MG: Performed by: INTERNAL MEDICINE

## 2024-01-03 PROCEDURE — 63710000001 INSULIN REGULAR HUMAN PER 5 UNITS: Performed by: INTERNAL MEDICINE

## 2024-01-03 PROCEDURE — 25010000002 METRONIDAZOLE 500 MG/100ML SOLUTION: Performed by: INTERNAL MEDICINE

## 2024-01-03 PROCEDURE — 84100 ASSAY OF PHOSPHORUS: CPT | Performed by: INTERNAL MEDICINE

## 2024-01-03 RX ADMIN — METRONIDAZOLE 500 MG: 500 INJECTION, SOLUTION INTRAVENOUS at 14:14

## 2024-01-03 RX ADMIN — ONDANSETRON 4 MG: 2 INJECTION INTRAMUSCULAR; INTRAVENOUS at 14:13

## 2024-01-03 RX ADMIN — SODIUM CHLORIDE 250 MG: 9 INJECTION, SOLUTION INTRAVENOUS at 21:14

## 2024-01-03 RX ADMIN — FLUTICASONE PROPIONATE 2 SPRAY: 50 SPRAY, METERED NASAL at 11:21

## 2024-01-03 RX ADMIN — Medication 15 ML: at 11:20

## 2024-01-03 RX ADMIN — DOCUSATE SODIUM 50MG AND SENNOSIDES 8.6MG 2 TABLET: 8.6; 5 TABLET, FILM COATED ORAL at 23:23

## 2024-01-03 RX ADMIN — IOPAMIDOL 90 ML: 755 INJECTION, SOLUTION INTRAVENOUS at 17:15

## 2024-01-03 RX ADMIN — SODIUM CHLORIDE, POTASSIUM CHLORIDE, SODIUM LACTATE AND CALCIUM CHLORIDE 150 ML/HR: 600; 310; 30; 20 INJECTION, SOLUTION INTRAVENOUS at 00:45

## 2024-01-03 RX ADMIN — MEPERIDINE HYDROCHLORIDE 25 MG: 25 INJECTION INTRAMUSCULAR; INTRAVENOUS; SUBCUTANEOUS at 00:26

## 2024-01-03 RX ADMIN — QUETIAPINE FUMARATE 100 MG: 100 TABLET ORAL at 23:22

## 2024-01-03 RX ADMIN — METRONIDAZOLE 500 MG: 500 INJECTION, SOLUTION INTRAVENOUS at 19:49

## 2024-01-03 RX ADMIN — LEVOFLOXACIN 750 MG: 750 INJECTION, SOLUTION INTRAVENOUS at 12:34

## 2024-01-03 RX ADMIN — INSULIN HUMAN 2 UNITS: 100 INJECTION, SOLUTION PARENTERAL at 18:18

## 2024-01-03 RX ADMIN — PROCHLORPERAZINE EDISYLATE 5 MG: 5 INJECTION INTRAMUSCULAR; INTRAVENOUS at 11:20

## 2024-01-03 RX ADMIN — PANTOPRAZOLE SODIUM 40 MG: 40 INJECTION, POWDER, FOR SOLUTION INTRAVENOUS at 05:01

## 2024-01-03 RX ADMIN — PROCHLORPERAZINE EDISYLATE 5 MG: 5 INJECTION INTRAMUSCULAR; INTRAVENOUS at 19:50

## 2024-01-03 RX ADMIN — SODIUM CHLORIDE 250 MG: 9 INJECTION, SOLUTION INTRAVENOUS at 11:19

## 2024-01-03 RX ADMIN — IOHEXOL 250 ML: 12 SOLUTION ORAL at 11:19

## 2024-01-03 RX ADMIN — DOCUSATE SODIUM 50MG AND SENNOSIDES 8.6MG 2 TABLET: 8.6; 5 TABLET, FILM COATED ORAL at 11:20

## 2024-01-03 RX ADMIN — IOHEXOL 250 ML: 12 SOLUTION ORAL at 14:05

## 2024-01-03 RX ADMIN — QUETIAPINE FUMARATE 100 MG: 100 TABLET ORAL at 11:20

## 2024-01-03 RX ADMIN — SODIUM CHLORIDE 250 MG: 9 INJECTION, SOLUTION INTRAVENOUS at 02:40

## 2024-01-03 RX ADMIN — SODIUM CHLORIDE, POTASSIUM CHLORIDE, SODIUM LACTATE AND CALCIUM CHLORIDE 150 ML/HR: 600; 310; 30; 20 INJECTION, SOLUTION INTRAVENOUS at 08:15

## 2024-01-03 RX ADMIN — METHYLPREDNISOLONE SODIUM SUCCINATE 40 MG: 40 INJECTION INTRAMUSCULAR; INTRAVENOUS at 11:19

## 2024-01-03 RX ADMIN — METRONIDAZOLE 500 MG: 500 INJECTION, SOLUTION INTRAVENOUS at 04:47

## 2024-01-03 RX ADMIN — Medication 10 ML: at 11:20

## 2024-01-03 NOTE — PROGRESS NOTES
[facility]   Hospitalist Progress Note  Date: 1/3/2024  Patient Name: hCai Gaston  : 1965  MRN: [id number]  Date of admission: 2024    Subjective   Subjective     Chief Complaint:   Abdominal pain    Summary:   Chai Gaston is a 58 y.o. male past medical history significant for Crohn's, multiple small bowel obstructions who presents to the hospital with a 1 day history of abdominal pain, nausea and vomiting.  Patient states that several days ago he began having diarrhea, this subsequently has stopped, patient is not passing any gas.  Patient's abdomen is, he was evaluated by the emergency department where he underwent CT scan of the abdomen which was concerning for SBO.  General surgery and gastroenterology was consulted and the hospitalist service is asked to admit for further workup and management.     Interval Followup:   Patient did have some return of bowel function this a.m., still complaining of abdominal pain, general surgery planning for repeat imaging today    Objective   Objective     Vitals:   Temp:  [97.7 °F (36.5 °C)-99.1 °F (37.3 °C)] 97.9 °F (36.6 °C)  Heart Rate:  [] 101  Resp:  [18] 18  BP: (128-145)/(51-90) 133/75    Physical Exam   GEN: No acute distress  HEENT: Moist mucous membranes  LUNGS: Equal chest rise bilaterally  CARDIAC: Regular rate and rhythm  NEURO: Moving all 4 extremities spontaneously  SKIN: No obvious breakdown    Result Review    Result Review:  I have personally reviewed the results as below  [x]  Laboratory CBC, CMP personally reviewed  CBC          2024    05:40 2024    05:53 1/3/2024    05:01   CBC   WBC 12.26  8.30  6.59    RBC 5.86  5.21  4.97    Hemoglobin 16.8  14.8  14.2    Hematocrit 48.7  44.1  42.6    MCV 83.1  84.6  85.7    MCH 28.7  28.4  28.6    MCHC 34.5  33.6  33.3    RDW 11.7  11.9  12.2    Platelets 253  195  195      CMP          2024    05:40 2024    05:53 2024    16:30 1/3/2024    05:01   CMP    Glucose 153  144  159  137    BUN 11  19  20  21    Creatinine 1.23  1.24  1.39  1.27    EGFR 67.2  66.6  58.0  64.7    Sodium 138  134  137  136    Potassium 5.0  5.4  4.6  4.2    Chloride 99  103  102  103    Calcium 9.5  8.8  9.1  8.6    Total Protein 7.5  6.4   6.1    Albumin 4.4  3.7   3.6    Globulin 3.1  2.7   2.5    Total Bilirubin 0.6  0.5   0.4    Alkaline Phosphatase 69  50   43    AST (SGOT) 34  26   19    ALT (SGPT) 41  27   20    Albumin/Globulin Ratio 1.4  1.4   1.4    BUN/Creatinine Ratio 8.9  15.3  14.4  16.5    Anion Gap 15.3  12.7  12.2  11.1      []  Microbiology  []  Radiology  []  EKG/Telemetry   []  Cardiology/Vascular   []  Pathology  []  Old records  []  Other:    Assessment & Plan   Assessment / Plan     Assessment:  Small bowel obstruction  History of Crohn's disease  Depression  Diabetes mellitus  Hyperlipidemia  Hypertriglyceridemia  History of MI  PTSD  Multiple abdominal surgeries  Hyperkalemia  Lactic acidosis     Plan:  Patient admitted to the hospital for further workup and management of above  Continue Solu-Medrol, Levaquin and Flagyl  Continue n.p.o. status, continue bowel rest  Continue NG tube to low wall suction  Continue sliding-scale insulin Accu-Chek every 6 hours as patient is n.p.o.  General surgery consulted, discussed management plan with Dr. Vivas 1/3/2024  Gastroenterology consulted, discussed management plan with Dr. Ybarra 1/3/2024   CBC, CMP reviewed 1/3/2024   Repeat CBC, CMP, mag and Phos in a.m. 1/3/2024   CT scan of the abdomen and pelvis personally reviewed, consistent with small bowel obstruction, repeat imaging pending today  Resume appropriate home medications once reconciled, can clamp the tube and give medicines as needed  Received Demerol for pain, IV Tylenol  Repeat BMP given hyperkalemia on a.m. labs 1/3/2024   Continue IV fluids, lactic acid improving, monitor volume status closely     Reviewed patients labs and imaging, and discussed with  patient and nurse at bedside.    DVT prophylaxis:  Mechanical DVT prophylaxis orders are present.    CODE STATUS:   Code Status (Patient has no pulse and is not breathing): CPR (Attempt to Resuscitate)  Medical Interventions (Patient has pulse or is breathing): Full Support        Electronically signed by Shyam Odonnell MD, 01/03/24, 12:37 PM EST.

## 2024-01-03 NOTE — PROGRESS NOTES
[facility]  Inpatient Progress Note    Reason for Follow Up:  SBO    Subjective     Interval History:   Pt reports multiple bowel movements over night.  Abd pain improved.    Current Facility-Administered Medications:     acetaminophen (OFIRMEV) injection 1,000 mg, 1,000 mg, Intravenous, Once PRN, Sally Eastman PA    sennosides-docusate (PERICOLACE) 8.6-50 MG per tablet 2 tablet, 2 tablet, Nasogastric, BID, 2 tablet at 01/03/24 1120 **AND** polyethylene glycol (MIRALAX) packet 17 g, 17 g, Nasogastric, Daily PRN **AND** [DISCONTINUED] bisacodyl (DULCOLAX) EC tablet 5 mg, 5 mg, Oral, Daily PRN **AND** bisacodyl (DULCOLAX) suppository 10 mg, 10 mg, Rectal, Daily PRN, Shyam Odonnell MD    [START ON 1/4/2024] cefTRIAXone (ROCEPHIN) 2000 mg/100 mL 0.9% NS IVPB (MBP), 2,000 mg, Intravenous, Q24H, Shyam Odonnell MD    dextrose (D50W) (25 g/50 mL) IV injection 25 g, 25 g, Intravenous, Q15 Min PRN, Shyam Odonnell MD    dextrose (GLUTOSE) oral gel 15 g, 15 g, Oral, Q15 Min PRN, Shyam Odonnell MD    fluticasone (FLONASE) 50 MCG/ACT nasal spray 2 spray, 2 spray, Nasal, Daily, Shyam Odonnell MD, 2 spray at 01/03/24 1121    glucagon (GLUCAGEN) injection 1 mg, 1 mg, Intramuscular, Q15 Min PRN, Shyam Odonnell MD    insulin regular (humuLIN R,novoLIN R) injection 2-7 Units, 2-7 Units, Subcutaneous, Q6H, Shyam Odonnell MD, 2 Units at 01/02/24 1301    lactated ringers infusion, 150 mL/hr, Intravenous, Continuous, Shyam Odonnell MD, Last Rate: 150 mL/hr at 01/03/24 0815, 150 mL/hr at 01/03/24 0815    melatonin tablet 5 mg, 5 mg, Nasogastric, Nightly PRN, Shyam Odonnell MD, 5 mg at 01/02/24 2044    methylPREDNISolone sodium succinate (SOLU-Medrol) injection 40 mg, 40 mg, Intravenous, Daily, Shyam Odonnell MD, 40 mg at 01/03/24 1119    metroNIDAZOLE (FLAGYL) IVPB 500 mg, 500 mg, Intravenous, Q8H, Shyam Odonnell MD, Last  Rate: 100 mL/hr at 01/03/24 1414, 500 mg at 01/03/24 1414    multivitamin and minerals liquid 15 mL, 15 mL, Nasogastric, Daily, Shyam Odonnell MD, 15 mL at 01/03/24 1120    ondansetron (ZOFRAN) injection 4 mg, 4 mg, Intravenous, Q6H PRN, Shyam Odonnell MD, 4 mg at 01/03/24 1413    pantoprazole (PROTONIX) injection 40 mg, 40 mg, Intravenous, Q AM, Shyam Odonnell MD, 40 mg at 01/03/24 0501    prochlorperazine (COMPAZINE) injection 5 mg, 5 mg, Intravenous, Q6H PRN, Shyam Odonnell MD, 5 mg at 01/03/24 1120    QUEtiapine (SEROquel) tablet 100 mg, 100 mg, Nasogastric, BID, Shyam Odonnell MD, 100 mg at 01/03/24 1120    sodium chloride 0.9 % flush 10 mL, 10 mL, Intravenous, PRN, Shyam Odonnell MD    sodium chloride 0.9 % flush 10 mL, 10 mL, Intravenous, Q12H, Shyam Odonnell MD, 10 mL at 01/03/24 1120    sodium chloride 0.9 % flush 10 mL, 10 mL, Intravenous, PRN, Shyam Odonnell MD    sodium chloride 0.9 % infusion 40 mL, 40 mL, Intravenous, PRN, Shyam Odonnell MD, 40 mL at 01/01/24 2348    valproate (DEPACON) 250 mg in sodium chloride 0.9 % 50 mL IVPB, 250 mg, Intravenous, Q6H, Shyam Odonnell MD, Stopped at 01/03/24 1405  Review of Systems:    The following systems were reviewed and negative;  constitution, respiratory, and cardiovascular    Objective     Vital Signs  Temp:  [97.7 °F (36.5 °C)-99.1 °F (37.3 °C)] 98.4 °F (36.9 °C)  Heart Rate:  [] 108  Resp:  [18-20] 20  BP: (128-145)/(51-90) 134/82  Body mass index is 33.9 kg/m².    Intake/Output Summary (Last 24 hours) at 1/3/2024 1630  Last data filed at 1/3/2024 1416  Gross per 24 hour   Intake 260 ml   Output 150 ml   Net 110 ml     I/O this shift:  In: 260 [IV Piggyback:260]  Out: -      Physical Exam:   General: awake, alert and in no acute distress   Eyes: eyes move symmetrical in all directions, no scleral icterus   Neck: supple, trachea is midline   Skin: warm and dry, not  jaundiced   Cardiovascular: no chest tenderness   Pulm: breathing unlabored   Abdomen: soft, diffuse TTP, tympanic to percussion   Rectal: deferred   Extremities: no rash or edema   Psychiatric: mental status within normal limits      Results Review:     I reviewed the patient's new clinical results.    Results from last 7 days   Lab Units 01/03/24  0501 01/02/24  0553 01/01/24  0540   WBC 10*3/mm3 6.59 8.30 12.26*   HEMOGLOBIN g/dL 14.2 14.8 16.8   HEMATOCRIT % 42.6 44.1 48.7   PLATELETS 10*3/mm3 195 195 253     Results from last 7 days   Lab Units 01/03/24  0501 01/02/24  1630 01/02/24  0553 01/01/24  0540   SODIUM mmol/L 136 137 134* 138   POTASSIUM mmol/L 4.2 4.6 5.4* 5.0   CHLORIDE mmol/L 103 102 103 99   CO2 mmol/L 21.9* 22.8 18.3* 23.7   BUN mg/dL 21 20 19 11   CREATININE mg/dL 1.27 1.39* 1.24 1.23   CALCIUM mg/dL 8.6 9.1 8.8 9.5   BILIRUBIN mg/dL 0.4  --  0.5 0.6   ALK PHOS U/L 43  --  50 69   ALT (SGPT) U/L 20  --  27 41   AST (SGOT) U/L 19  --  26 34   GLUCOSE mg/dL 137* 159* 144* 153*         Lab Results   Lab Value Date/Time    LIPASE 26 01/01/2024 0540    LIPASE 27 12/08/2022 1642    LIPASE 30 11/25/2022 0733    LIPASE 31 02/25/2019 2301         Assessment & Plan   Assessment:     SBO  Crohn's disease    Plan:     Continue NG tube  continue solumedrol IV  continue with levfloxacin, flagyl  Pt would likely benefit from a biologic at this point; d/w patient, and he [facility]    I discussed the patients findings and my recommendations with patient, nursing staff, and primary care team.         Autumn Ybarra M.D.  [address] [place]  [address]  Office: [phone]

## 2024-01-03 NOTE — PROGRESS NOTES
"   [facility]     Progress Note    Patient Name: Chai Gaston  : 1965  MRN: [id number]  Primary Care Physician:  Raheem Tobar MD  Date of admission: 2024    Subjective   Subjective     Chief Complaint: Nausea vomiting    HPI:  Patient Reports feeling better.  Multiple bowel movements.  Less nausea.  NG tube output is less.      Objective   Objective     Vitals:   Temp:  [97.7 °F (36.5 °C)-99.1 °F (37.3 °C)] 98.4 °F (36.9 °C)  Heart Rate:  [] 108  Resp:  [18-20] 20  BP: (128-145)/(51-90) 134/82    Physical Exam  Constitutional:       Appearance: Normal appearance.   Cardiovascular:      Rate and Rhythm: Normal rate.   Pulmonary:      Effort: Pulmonary effort is normal.   Abdominal:      Palpations: Abdomen is soft.         Result Review    Result Review:  I have personally reviewed the results from the time of this admission to 1/3/2024 17:01 EST and agree with these findings:  []  Laboratory  []  Microbiology  []  Radiology  []  EKG/Telemetry   []  Cardiology/Vascular   []  Pathology  []  Old records  []  Other:      Assessment & Plan   Assessment / Plan     Brief Patient Summary:  Chai Gaston is a 58 y.o. male who has Crohn's flare versus possible bowel obstruction    Active Hospital Problems:  Active Hospital Problems    Diagnosis     **SBO (small bowel obstruction)      Plan:  Patient is now having bowel function with multiple bowel movements  Patient feels better  Patient is still concerned about having something \"blocking\" his bowels  Will go ahead and get a CT scan with oral contrast to make sure that contrast flows through to the colon  If CT shows improvement and there is contrast to the colon then I am okay with starting clear liquids removing NG tube and advancing diet as tolerated  Crohn's per GI recommendations         DVT prophylaxis:  Mechanical DVT prophylaxis orders are present.    CODE STATUS:   Code Status (Patient has no pulse and is not breathing): CPR " (Attempt to Resuscitate)  Medical Interventions (Patient has pulse or is breathing): Full Support    Disposition:  I expect patient to be discharged unsure.    Electronically signed by Julio Cesar Vivas MD, 01/03/24, 5:01 PM EST.

## 2024-01-04 LAB — GLUCOSE BLDC GLUCOMTR-MCNC: 82 MG/DL (ref 70–99)

## 2024-01-04 PROCEDURE — 25010000002 METRONIDAZOLE 500 MG/100ML SOLUTION: Performed by: INTERNAL MEDICINE

## 2024-01-04 PROCEDURE — 25010000002 METHYLPREDNISOLONE PER 40 MG: Performed by: INTERNAL MEDICINE

## 2024-01-04 PROCEDURE — 99231 SBSQ HOSP IP/OBS SF/LOW 25: CPT | Performed by: NURSE PRACTITIONER

## 2024-01-04 PROCEDURE — 99233 SBSQ HOSP IP/OBS HIGH 50: CPT | Performed by: FAMILY MEDICINE

## 2024-01-04 PROCEDURE — 25810000003 LACTATED RINGERS PER 1000 ML: Performed by: INTERNAL MEDICINE

## 2024-01-04 PROCEDURE — 25010000002 CEFTRIAXONE PER 250 MG: Performed by: INTERNAL MEDICINE

## 2024-01-04 PROCEDURE — 25010000002 PROCHLORPERAZINE 10 MG/2ML SOLUTION: Performed by: INTERNAL MEDICINE

## 2024-01-04 PROCEDURE — 82948 REAGENT STRIP/BLOOD GLUCOSE: CPT

## 2024-01-04 RX ORDER — CHOLECALCIFEROL (VITAMIN D3) 125 MCG
5 CAPSULE ORAL NIGHTLY PRN
Status: DISCONTINUED | OUTPATIENT
Start: 2024-01-04 | End: 2024-01-05 | Stop reason: HOSPADM

## 2024-01-04 RX ORDER — QUETIAPINE FUMARATE 100 MG/1
100 TABLET, FILM COATED ORAL 2 TIMES DAILY
Status: DISCONTINUED | OUTPATIENT
Start: 2024-01-04 | End: 2024-01-05 | Stop reason: HOSPADM

## 2024-01-04 RX ORDER — POLYETHYLENE GLYCOL 3350 17 G/17G
17 POWDER, FOR SOLUTION ORAL DAILY PRN
Status: DISCONTINUED | OUTPATIENT
Start: 2024-01-04 | End: 2024-01-05 | Stop reason: HOSPADM

## 2024-01-04 RX ORDER — ACETAMINOPHEN 500 MG
1000 TABLET ORAL ONCE AS NEEDED
Status: COMPLETED | OUTPATIENT
Start: 2024-01-04 | End: 2024-01-04

## 2024-01-04 RX ORDER — BISACODYL 10 MG
10 SUPPOSITORY, RECTAL RECTAL DAILY PRN
Status: DISCONTINUED | OUTPATIENT
Start: 2024-01-04 | End: 2024-01-05 | Stop reason: HOSPADM

## 2024-01-04 RX ORDER — AMOXICILLIN 250 MG
2 CAPSULE ORAL 2 TIMES DAILY
Status: DISCONTINUED | OUTPATIENT
Start: 2024-01-04 | End: 2024-01-05 | Stop reason: HOSPADM

## 2024-01-04 RX ORDER — MULTIPLE VITAMINS W/ MINERALS TAB 9MG-400MCG
1 TAB ORAL DAILY
Status: DISCONTINUED | OUTPATIENT
Start: 2024-01-05 | End: 2024-01-05 | Stop reason: HOSPADM

## 2024-01-04 RX ADMIN — SODIUM CHLORIDE 250 MG: 9 INJECTION, SOLUTION INTRAVENOUS at 15:56

## 2024-01-04 RX ADMIN — SODIUM CHLORIDE 250 MG: 9 INJECTION, SOLUTION INTRAVENOUS at 08:29

## 2024-01-04 RX ADMIN — METRONIDAZOLE 500 MG: 500 INJECTION, SOLUTION INTRAVENOUS at 20:42

## 2024-01-04 RX ADMIN — METHYLPREDNISOLONE SODIUM SUCCINATE 40 MG: 40 INJECTION INTRAMUSCULAR; INTRAVENOUS at 08:28

## 2024-01-04 RX ADMIN — SODIUM CHLORIDE, POTASSIUM CHLORIDE, SODIUM LACTATE AND CALCIUM CHLORIDE 150 ML/HR: 600; 310; 30; 20 INJECTION, SOLUTION INTRAVENOUS at 14:55

## 2024-01-04 RX ADMIN — FLUTICASONE PROPIONATE 2 SPRAY: 50 SPRAY, METERED NASAL at 08:29

## 2024-01-04 RX ADMIN — CEFTRIAXONE SODIUM 2000 MG: 2 INJECTION, POWDER, FOR SOLUTION INTRAMUSCULAR; INTRAVENOUS at 12:37

## 2024-01-04 RX ADMIN — METRONIDAZOLE 500 MG: 500 INJECTION, SOLUTION INTRAVENOUS at 13:38

## 2024-01-04 RX ADMIN — DOCUSATE SODIUM 50MG AND SENNOSIDES 8.6MG 2 TABLET: 8.6; 5 TABLET, FILM COATED ORAL at 20:42

## 2024-01-04 RX ADMIN — PROCHLORPERAZINE EDISYLATE 5 MG: 5 INJECTION INTRAMUSCULAR; INTRAVENOUS at 10:20

## 2024-01-04 RX ADMIN — Medication 10 ML: at 20:43

## 2024-01-04 RX ADMIN — QUETIAPINE FUMARATE 100 MG: 100 TABLET ORAL at 20:42

## 2024-01-04 RX ADMIN — ACETAMINOPHEN 1000 MG: 500 TABLET ORAL at 10:14

## 2024-01-04 RX ADMIN — Medication 10 ML: at 08:28

## 2024-01-04 RX ADMIN — METRONIDAZOLE 500 MG: 500 INJECTION, SOLUTION INTRAVENOUS at 04:09

## 2024-01-04 RX ADMIN — PANTOPRAZOLE SODIUM 40 MG: 40 INJECTION, POWDER, FOR SOLUTION INTRAVENOUS at 06:23

## 2024-01-04 RX ADMIN — SODIUM CHLORIDE 250 MG: 9 INJECTION, SOLUTION INTRAVENOUS at 02:14

## 2024-01-04 RX ADMIN — Medication 15 ML: at 08:28

## 2024-01-04 RX ADMIN — QUETIAPINE FUMARATE 100 MG: 100 TABLET ORAL at 08:28

## 2024-01-04 RX ADMIN — SODIUM CHLORIDE 250 MG: 9 INJECTION, SOLUTION INTRAVENOUS at 20:44

## 2024-01-04 RX ADMIN — DOCUSATE SODIUM 50MG AND SENNOSIDES 8.6MG 2 TABLET: 8.6; 5 TABLET, FILM COATED ORAL at 08:28

## 2024-01-04 RX ADMIN — PROCHLORPERAZINE EDISYLATE 5 MG: 5 INJECTION INTRAMUSCULAR; INTRAVENOUS at 20:42

## 2024-01-04 NOTE — PROGRESS NOTES
"PROGRESS NOTE     Patient Name:  Chai Gaston  YOB: 1965  [id number]   LOS: 3 days   * No surgery found *  Patient Care Team:  Raheem Tobar MD as PCP - General (Internal Medicine)      Reason for Consult/Chief complaint:  follow up on nausea/vomiting    Subjective     Interval History: VSS, afebrile, CT of abd and pelvis yesterday shows oral contrast progresses to colon. Tolerating clear liquid diet.  No abdominal pain.       Review of Systems:      A complete review of systems was performed and all are negative except what is documented in the HPI.       Objective         Physical Exam:     Constitutional:  well nourished, no acute distress, appears stated age /77 (BP Location: Right arm, Patient Position: Lying)   Pulse 85   Temp 97.5 °F (36.4 °C) (Oral)   Resp 20   Ht 182.9 cm (72.01\")   Wt 113 kg (250 lb)   SpO2 94%   BMI 33.90 kg/m²    Eyes:  anicteric sclerae, moist conjunctivae, no lid lag, PERRLA  ENMT:  oropharynx clear, moist mucous membranes, good dentition  Neck:   full ROM, trachea midline, no thyromegaly  Cardiovascular: RRR, S1 and S2 present, no MRG, heart rate 85, no pedal edema  Respiratory: lungs CTA, respirations even and unlabored  GI:  Abdomen soft, nontender, nondistended, no HSM     Skin:  warm and dry, normal turgor, no rashes  Psychiatric:  alert and oriented x3, intact judgment and insight, cooperative    Results Review:      I reviewed the patient's new clinical results including no new labs, CT of abd and pelvis with oral contrast.  LABS:  WBC   Date Value Ref Range Status   01/03/2024 6.59 3.40 - 10.80 10*3/mm3 Final     RBC   Date Value Ref Range Status   01/03/2024 4.97 4.14 - 5.80 10*6/mm3 Final     Hemoglobin   Date Value Ref Range Status   01/03/2024 14.2 13.0 - 17.7 g/dL Final     Hematocrit   Date Value Ref Range Status   01/03/2024 42.6 37.5 - 51.0 % Final     MCV   Date Value Ref Range Status   01/03/2024 85.7 79.0 - 97.0 fL Final     MCH "   Date Value Ref Range Status   01/03/2024 28.6 26.6 - 33.0 pg Final     MCHC   Date Value Ref Range Status   01/03/2024 33.3 31.5 - 35.7 g/dL Final     RDW   Date Value Ref Range Status   01/03/2024 12.2 (L) 12.3 - 15.4 % Final     RDW-SD   Date Value Ref Range Status   01/03/2024 38.1 37.0 - 54.0 fl Final     MPV   Date Value Ref Range Status   01/03/2024 9.0 6.0 - 12.0 fL Final     Platelets   Date Value Ref Range Status   01/03/2024 195 140 - 450 10*3/mm3 Final     Neutrophil %   Date Value Ref Range Status   01/03/2024 59.7 42.7 - 76.0 % Final     Lymphocyte %   Date Value Ref Range Status   01/03/2024 23.8 19.6 - 45.3 % Final     Monocyte %   Date Value Ref Range Status   01/03/2024 16.1 (H) 5.0 - 12.0 % Final     Eosinophil %   Date Value Ref Range Status   01/03/2024 0.0 (L) 0.3 - 6.2 % Final     Basophil %   Date Value Ref Range Status   01/03/2024 0.2 0.0 - 1.5 % Final     Immature Grans %   Date Value Ref Range Status   01/03/2024 0.2 0.0 - 0.5 % Final     Neutrophils, Absolute   Date Value Ref Range Status   01/03/2024 3.94 1.70 - 7.00 10*3/mm3 Final     Lymphocytes, Absolute   Date Value Ref Range Status   01/03/2024 1.57 0.70 - 3.10 10*3/mm3 Final     Monocytes, Absolute   Date Value Ref Range Status   01/03/2024 1.06 (H) 0.10 - 0.90 10*3/mm3 Final     Eosinophils, Absolute   Date Value Ref Range Status   01/03/2024 0.00 0.00 - 0.40 10*3/mm3 Final     Basophils, Absolute   Date Value Ref Range Status   01/03/2024 0.01 0.00 - 0.20 10*3/mm3 Final     Immature Grans, Absolute   Date Value Ref Range Status   01/03/2024 0.01 0.00 - 0.05 10*3/mm3 Final     nRBC   Date Value Ref Range Status   01/03/2024 0.0 0.0 - 0.2 /100 WBC Final         Basic Metabolic Panel    Sodium Sodium   Date Value Ref Range Status   01/03/2024 136 136 - 145 mmol/L Final   01/02/2024 137 136 - 145 mmol/L Final   01/02/2024 134 (L) 136 - 145 mmol/L Final      Potassium Potassium   Date Value Ref Range Status   01/03/2024 4.2 3.5 -  "5.2 mmol/L Final   01/02/2024 4.6 3.5 - 5.2 mmol/L Final   01/02/2024 5.4 (H) 3.5 - 5.2 mmol/L Final     Comment:     Slight hemolysis detected by analyzer. Result may be falsely elevated.      Chloride Chloride   Date Value Ref Range Status   01/03/2024 103 98 - 107 mmol/L Final   01/02/2024 102 98 - 107 mmol/L Final   01/02/2024 103 98 - 107 mmol/L Final      Bicarbonate No results found for: \"PLASMABICARB\"   BUN BUN   Date Value Ref Range Status   01/03/2024 21 8 - 23 mg/dL Final   01/02/2024 20 8 - 23 mg/dL Final   01/02/2024 19 8 - 23 mg/dL Final      Creatinine Creatinine   Date Value Ref Range Status   01/03/2024 1.27 0.76 - 1.27 mg/dL Final   01/02/2024 1.39 (H) 0.76 - 1.27 mg/dL Final   01/02/2024 1.24 0.76 - 1.27 mg/dL Final      Calcium Calcium   Date Value Ref Range Status   01/03/2024 8.6 8.6 - 10.5 mg/dL Final   01/02/2024 9.1 8.6 - 10.5 mg/dL Final   01/02/2024 8.8 8.6 - 10.5 mg/dL Final      Glucose      No components found for: \"GLUCOSE.*\"         IMAGING:  Imaging Results (Last 72 Hours)       Procedure Component Value Units Date/Time    CT Abdomen Pelvis With Contrast [[id number]] Collected: 01/04/24 0551     Updated: 01/04/24 0554    Narrative:      PROCEDURE: CT ABDOMEN PELVIS W CONTRAST     COMPARISON: 1/1/2024.     INDICATIONS: History of mechanical bowel obstruction; oral contrast administered with delayed CT   imaging.     TECHNIQUE: After obtaining the patient's consent, 730 CT images were created with non-ionic   intravenous contrast material.  Oral contrast agent was administered for the exam.     PROTOCOL:   Standard CT imaging protocol performed.      RADIATION:   Total DLP: 885 mGy*cm; total mAs: 6,305.    Automated exposure control was utilized to minimize radiation dose.   CONTRAST: 90 mL Isovue 370 I.V.     FINDINGS: A nasogastric (NG) tube is in place with its distal tip in the distal gastric lumen.    Interval decompression proximal small bowel is seen.  The administered oral " contrast agent does   appear to progress distal to the anastomosis involving the anterior right lower quadrant of the   abdomen.  Oral contrast agent is seen in nondilated distal small bowel as well as within colon.    These findings are against a complete mechanical small bowel obstruction.  No pneumoperitoneum or   pneumatosis.  No portal or mesenteric venous gas.  A small amount of ascites is seen, which is   thought to be increased since the prior study.  It has a CT number of about 6 Hounsfield units or   less.  Minimal pleural effusion is suggested bilaterally.  There may be a tiny hiatal hernia.    Diffuse hepatic steatosis is noted.  Borderline hepatomegaly is possible.  There is slight   asymmetric elevation of the right diaphragm.  No splenomegaly.  There may be mild cardiomegaly.  A   trace amount pericardial effusion is seen.  The patient has undergone cholecystectomy and suspected   appendectomy.  There has been ventral hernia repair.  Again, there are postoperative changes of the   bowel.  Mild diffuse prostatomegaly is possible.  Mild distension of the urinary bladder is seen.    There are postoperative changes of the lower lumbar spine, as before.       Impression:       Favorable progression is noted with progression of the oral contrast agent to the   colon, which is against a complete small bowel obstruction.  There is also thought to be some   degree of interval decompression of the dilated proximal small bowel since the prior 1/1/2024 CT   study.  There is a nasogastric (NG) tube in place.  Please see above comments for further detail.        Please note that portions of this note were completed with a voice recognition program.     MORTEZA PINEDO JR, MD         Electronically Signed and Approved By: MORTEZA PINEDO JR, MD on 1/04/2024 at 5:51                        XR Abdomen KUB [[id number]] Collected: 01/02/24 0414     Updated: 01/02/24 0417    Narrative:      PROCEDURE: XR ABDOMEN KUB      COMPARISONS: 1/1/2024.     INDICATIONS: ABDOMINAL DISTENTION/ PAIN/ SMALL BOWEL OBSTRUCTION; IMAGING FOLLOW-UP.     FINDINGS: Three AP supine views of the abdomen and pelvis are provided for review.  The distal tip   of the nasogastric (NG) tube is in the expected gastric lumen, probably within the distal gastric   lumen.  The distal tip of the nasogastric (NG) tube is projected roughly 23 cm beyond the expected   gastroesophageal (GE) junction.  Its position is thought to be similar to that seen on the prior   study from 1/1/2024 at 1410 hours.  A persistent mechanical small bowel obstruction is suspected   with dilated proximal small bowel, estimated at 6 cm in greatest diameter.  To a lesser extent gas   is seen in relatively nondilated colon.  There are postoperative changes suggestive of prior   ventral hernia repair.  There is also posterior fusion in the lumbar spine at about the L4-5 level.       Impression:       A persistent mechanical small bowel obstruction is suspected.  No significant change in   the nasogastric (NG) tube position.             Please note that portions of this note were completed with a voice recognition program.     MORTEZA PINEDO JR, MD         Electronically Signed and Approved By: MORTEZA PINEDO JR, MD on 1/02/2024 at 4:14                        XR Abdomen KUB [[id number]] Collected: 01/01/24 1508     Updated: 01/01/24 1511    Narrative:      PROCEDURE: XR ABDOMEN KUB     COMPARISON: [facility], CT, CT ABDOMEN PELVIS WO CONTRAST, 1/01/2024, 6:29.     INDICATIONS: NG PLACEMENT     FINDINGS:   NG tube is in the stomach.  The tip is at the level of the gastric antrum, 14 cm beyond the GE   junction.     The stomach is not abnormally distended.       Impression:         KUB demonstrating NG tube in the stomach, the tip is 14 cm beyond the GE junction.            OWEN MEYER MD         Electronically Signed and Approved By: OWEN MEYER MD on 1/01/2024 at 15:07                              Medications:    Current Facility-Administered Medications:     acetaminophen (OFIRMEV) injection 1,000 mg, 1,000 mg, Intravenous, Once PRN, Sally Eastman PA    sennosides-docusate (PERICOLACE) 8.6-50 MG per tablet 2 tablet, 2 tablet, Nasogastric, BID, 2 tablet at 01/04/24 0828 **AND** polyethylene glycol (MIRALAX) packet 17 g, 17 g, Nasogastric, Daily PRN **AND** [DISCONTINUED] bisacodyl (DULCOLAX) EC tablet 5 mg, 5 mg, Oral, Daily PRN **AND** bisacodyl (DULCOLAX) suppository 10 mg, 10 mg, Rectal, Daily PRN, Shyam Odonnell MD    cefTRIAXone (ROCEPHIN) 2000 mg/100 mL 0.9% NS IVPB (MBP), 2,000 mg, Intravenous, Q24H, Shyam Odonnell MD    dextrose (D50W) (25 g/50 mL) IV injection 25 g, 25 g, Intravenous, Q15 Min PRN, Shyam Odonnell MD    dextrose (GLUTOSE) oral gel 15 g, 15 g, Oral, Q15 Min PRN, Shyam Odonnell MD    fluticasone (FLONASE) 50 MCG/ACT nasal spray 2 spray, 2 spray, Nasal, Daily, Shyam Odonnell MD, 2 spray at 01/04/24 0829    glucagon (GLUCAGEN) injection 1 mg, 1 mg, Intramuscular, Q15 Min PRN, Shyam Odonnell MD    insulin regular (humuLIN R,novoLIN R) injection 2-7 Units, 2-7 Units, Subcutaneous, Q6H, Shyam Odonnell MD, 2 Units at 01/03/24 1818    lactated ringers infusion, 150 mL/hr, Intravenous, Continuous, Shyam Odonnell MD, Last Rate: 150 mL/hr at 01/03/24 0815, 150 mL/hr at 01/03/24 0815    melatonin tablet 5 mg, 5 mg, Nasogastric, Nightly PRN, Shyam Odonnell MD, 5 mg at 01/02/24 2044    methylPREDNISolone sodium succinate (SOLU-Medrol) injection 40 mg, 40 mg, Intravenous, Daily, Shyam Odonnell MD, 40 mg at 01/04/24 0828    metroNIDAZOLE (FLAGYL) IVPB 500 mg, 500 mg, Intravenous, Q8H, Shyam Odonnell MD, Last Rate: 100 mL/hr at 01/04/24 0409, 500 mg at 01/04/24 0409    multivitamin and minerals liquid 15 mL, 15 mL, Nasogastric, Daily, Shyam Odonnell MD, 15 mL at 01/04/24 0828     ondansetron (ZOFRAN) injection 4 mg, 4 mg, Intravenous, Q6H PRN, Shyam Odonnell MD, 4 mg at 01/03/24 1413    pantoprazole (PROTONIX) injection 40 mg, 40 mg, Intravenous, Q AM, Shyam Odonnell MD, 40 mg at 01/04/24 0623    prochlorperazine (COMPAZINE) injection 5 mg, 5 mg, Intravenous, Q6H PRN, Shyam Odonnell MD, 5 mg at 01/03/24 1950    QUEtiapine (SEROquel) tablet 100 mg, 100 mg, Nasogastric, BID, Shyam Odonnell MD, 100 mg at 01/04/24 0828    sodium chloride 0.9 % flush 10 mL, 10 mL, Intravenous, PRN, Shyam Odonnell MD    sodium chloride 0.9 % flush 10 mL, 10 mL, Intravenous, Q12H, Shyam Odonnell MD, 10 mL at 01/04/24 0828    sodium chloride 0.9 % flush 10 mL, 10 mL, Intravenous, PRN, Shyam Odonnell MD    sodium chloride 0.9 % infusion 40 mL, 40 mL, Intravenous, PRN, Shyam Odonnell MD, 40 mL at 01/01/24 2348    valproate (DEPACON) 250 mg in sodium chloride 0.9 % 50 mL IVPB, 250 mg, Intravenous, Q6H, Shyam Odonnell MD, Last Rate: 0 mL/hr at 01/03/24 1405, 250 mg at 01/04/24 0829    Assessment & Plan       SBO (small bowel obstruction)   DC NG tube   Advance diet as tolerated   Dispo per primary      MARCO Womack  01/04/24  08:53 EST    Electronically signed by MARCO Womack, 01/04/24, 8:53 AM EST.    CT reviewed  Seen and agree

## 2024-01-04 NOTE — PLAN OF CARE
Goal Outcome Evaluation:      Patient complained or pain and nausea, medicated per orders, vitals stable, NG tube removed per orders, patient tolerating liquids well.

## 2024-01-05 ENCOUNTER — READMISSION MANAGEMENT (OUTPATIENT)
Dept: CALL CENTER | Facility: HOSPITAL | Age: 59
End: 2024-01-05
Payer: OTHER GOVERNMENT

## 2024-01-05 VITALS
SYSTOLIC BLOOD PRESSURE: 139 MMHG | HEIGHT: 72 IN | BODY MASS INDEX: 33.86 KG/M2 | TEMPERATURE: 98.1 F | HEART RATE: 84 BPM | DIASTOLIC BLOOD PRESSURE: 89 MMHG | WEIGHT: 250 LBS | RESPIRATION RATE: 18 BRPM | OXYGEN SATURATION: 97 %

## 2024-01-05 PROCEDURE — 99231 SBSQ HOSP IP/OBS SF/LOW 25: CPT | Performed by: SURGERY

## 2024-01-05 PROCEDURE — 25810000003 LACTATED RINGERS PER 1000 ML: Performed by: FAMILY MEDICINE

## 2024-01-05 PROCEDURE — 25010000002 METHYLPREDNISOLONE PER 40 MG: Performed by: INTERNAL MEDICINE

## 2024-01-05 PROCEDURE — 25010000002 CEFTRIAXONE PER 250 MG: Performed by: INTERNAL MEDICINE

## 2024-01-05 PROCEDURE — 99239 HOSP IP/OBS DSCHRG MGMT >30: CPT | Performed by: FAMILY MEDICINE

## 2024-01-05 PROCEDURE — 25010000002 METRONIDAZOLE 500 MG/100ML SOLUTION: Performed by: INTERNAL MEDICINE

## 2024-01-05 RX ORDER — PREDNISONE 10 MG/1
TABLET ORAL
Qty: 70 TABLET | Refills: 0 | Status: SHIPPED | OUTPATIENT
Start: 2024-01-05 | End: 2024-02-02

## 2024-01-05 RX ORDER — DIVALPROEX SODIUM 500 MG/1
1000 TABLET, EXTENDED RELEASE ORAL DAILY
Status: DISCONTINUED | OUTPATIENT
Start: 2024-01-05 | End: 2024-01-05 | Stop reason: HOSPADM

## 2024-01-05 RX ADMIN — Medication 10 ML: at 09:18

## 2024-01-05 RX ADMIN — Medication 1 TABLET: at 09:17

## 2024-01-05 RX ADMIN — METRONIDAZOLE 500 MG: 500 INJECTION, SOLUTION INTRAVENOUS at 12:20

## 2024-01-05 RX ADMIN — SODIUM CHLORIDE 250 MG: 9 INJECTION, SOLUTION INTRAVENOUS at 03:15

## 2024-01-05 RX ADMIN — SODIUM CHLORIDE, POTASSIUM CHLORIDE, SODIUM LACTATE AND CALCIUM CHLORIDE 100 ML/HR: 600; 310; 30; 20 INJECTION, SOLUTION INTRAVENOUS at 03:15

## 2024-01-05 RX ADMIN — METRONIDAZOLE 500 MG: 500 INJECTION, SOLUTION INTRAVENOUS at 05:44

## 2024-01-05 RX ADMIN — DOCUSATE SODIUM 50MG AND SENNOSIDES 8.6MG 2 TABLET: 8.6; 5 TABLET, FILM COATED ORAL at 09:17

## 2024-01-05 RX ADMIN — PANTOPRAZOLE SODIUM 40 MG: 40 INJECTION, POWDER, FOR SOLUTION INTRAVENOUS at 05:44

## 2024-01-05 RX ADMIN — FLUTICASONE PROPIONATE 2 SPRAY: 50 SPRAY, METERED NASAL at 09:18

## 2024-01-05 RX ADMIN — DIVALPROEX SODIUM 1000 MG: 500 TABLET, EXTENDED RELEASE ORAL at 10:15

## 2024-01-05 RX ADMIN — METHYLPREDNISOLONE SODIUM SUCCINATE 40 MG: 40 INJECTION INTRAMUSCULAR; INTRAVENOUS at 09:18

## 2024-01-05 RX ADMIN — QUETIAPINE FUMARATE 100 MG: 100 TABLET ORAL at 09:17

## 2024-01-05 RX ADMIN — CEFTRIAXONE SODIUM 2000 MG: 2 INJECTION, POWDER, FOR SOLUTION INTRAMUSCULAR; INTRAVENOUS at 12:20

## 2024-01-05 NOTE — PROGRESS NOTES
Please excuse Mr. Chai Gaston as he was hospitalized from 1/1/2024 through 1/5/2024.     Electronically signed by Ebenezer Rivera MD, 01/05/24, 12:12 PM EST.

## 2024-01-05 NOTE — DISCHARGE SUMMARY
[facility]         HOSPITALIST  DISCHARGE SUMMARY    Patient Name: Chai Gaston  : 1965  MRN: [id number]    Date of Admission: 2024  Date of Discharge: 2024  Primary Care Physician: Raheem Tobar MD    Consults       Date and Time Order Name Status Description    2024  9:19 AM Inpatient General Surgery Consult Completed     2024  9:09 AM Inpatient Gastroenterology Consult Completed     2024  8:30 AM Inpatient Hospitalist Consult      2024  7:19 AM IP General Consult (Use specialty-specific consult if known)              Active and Resolved Hospital Problems:  Small bowel obstruction suspect due to Crohn's disease  Crohn's disease  Depression  Diabetes mellitus  Hyperlipidemia  Hypertriglyceridemia  History of MI  PTSD  Multiple abdominal surgeries  Hyperkalemia  Lactic acidosis  Active Hospital Problems    Diagnosis POA    **SBO (small bowel obstruction) [K56.609] Yes      Resolved Hospital Problems   No resolved problems to display.       Hospital Course     Hospital Course:  Chai Gaston is a 58 y.o. male  past medical history significant for Crohn's, multiple small bowel obstructions who presents to the hospital with a 1 day history of abdominal pain, nausea and vomiting.  Patient states that several days ago he began having diarrhea, this subsequently has stopped, patient is not passing any gas.  Patient's abdomen is, he was evaluated by the emergency department where he underwent CT scan of the abdomen which was concerning for SBO.  General surgery and gastroenterology was consulted and the hospitalist service is asked to admit for further workup and management.  Patient started on empiric antibiotics and IV steroids.  Made n.p.o. and NG tube placed to suction.  Bowel function slowly returned.  Diet advanced.  Patient tolerating a diet.  Patient seen and evaluated on date of discharge and thus stable for discharge on steroid taper and follow-up  with primary care provider in 1 to 2 [facility].        DISCHARGE Follow Up Recommendations for labs and diagnostics: As above      Day of Discharge     Vital Signs:  Temp:  [97.5 °F (36.4 °C)-98.3 °F (36.8 °C)] 98.1 °F (36.7 °C)  Heart Rate:  [80-98] 84  Resp:  [18] 18  BP: (129-147)/(75-89) 139/89  Physical Exam:   Gen. well-developed appearing stated age in no acute distress  HEENT: Normocephalic atraumatic moist membranes pupils equal round reactive light, no scleral icterus no conjunctival injection  Cardiovascular: regular rate and rhythm no murmurs rubs or gallops S1-S2, no lower extremity edema appreciated  Pulmonary: Clear to auscultation bilaterally no wheezes rales or rhonchi symmetric chest expansion, unlabored, no conversational dyspnea appreciated  Gastrointestinal: Soft nontender nondistended positive bowel sounds all 4 quadrants no rebound or guarding  Musculoskeletal: No clubbing cyanosis, warm and well-perfused, calves soft symmetric nontender bilaterally  Skin: Clean dry without rashs  Neuro: Cranial nerves II through XII intact grossly no sensorimotor deficits appreciated bilateral upper and lower extremities  Psych: Patient is calm cooperative and appropriate with exam not responding to internal stimuli  : No Cote catheter no bladder distention no suprapubic tenderness      Discharge Details        Discharge Medications        New Medications        Instructions Start Date   predniSONE 10 MG tablet  Commonly known as: DELTASONE   Take 4 tablets by mouth Daily for 7 days, THEN 3 tablets Daily for 7 days, THEN 2 tablets Daily for 7 days, THEN 1 tablet Daily for 7 days.   Start Date: January 5, 2024            Changes to Medications        Instructions Start Date   polyethylene glycol 236 g solution  Commonly known as: GoLYTELY  What changed:   how much to take  how to take this  when to take this   Drink 10 ounces every 10 to 15 minutes until  entire bottle has been ingested.             Continue These Medications        Instructions Start Date   baclofen 20 MG tablet  Commonly known as: LIORESAL   20 mg, Oral, 3 Times Daily      benzonatate 100 MG capsule  Commonly known as: TESSALON   100 mg, Oral, 3 Times Daily PRN      cholecalciferol 25 MCG (1000 UT) tablet  Commonly known as: VITAMIN D3   2,000 Units, Oral, Daily      Diclofenac Sodium 1 % gel gel  Commonly known as: VOLTAREN   4 g, Topical, 4 Times Daily PRN      dicyclomine 20 MG tablet  Commonly known as: BENTYL   20 mg, Oral, 2 Times Daily      divalproex 500 MG 24 hr tablet  Commonly known as: DEPAKOTE ER   1,000 mg, Oral, Nightly      EPINEPHrine 0.3 MG/0.3ML solution auto-injector injection  Commonly known as: EPIPEN   0.3 mL, Once      fexofenadine 180 MG tablet  Commonly known as: ALLEGRA   180 mg, Oral, Daily      fluticasone 50 MCG/ACT nasal spray  Commonly known as: FLONASE   2 sprays, Nasal, Daily      hydrOXYzine 50 MG tablet  Commonly known as: ATARAX   50 mg, Oral, Nightly      K-Phos-Neutral 155-852-130 MG tablet   1 tablet, Oral, 2 Times Daily      ketoconazole 2 % shampoo  Commonly known as: NIZORAL   1 application , Topical, 2 Times Weekly      lidocaine 5 %  Commonly known as: LIDODERM   1 patch, Transdermal, Every 24 Hours, Remove & Discard patch within 12 hours or as directed by MD      losartan 25 MG tablet  Commonly known as: COZAAR   12.5 mg, Oral, Daily      mesalamine 1.2 g EC tablet  Commonly known as: LIALDA   1,200 mg, Oral, 4 Times Daily      MiraLax 17 GM/SCOOP powder  Generic drug: polyethylene glycol   17 g, Oral, Daily      ondansetron ODT 8 MG disintegrating tablet  Commonly known as: ZOFRAN-ODT   8 mg, Translingual, Every 8 Hours PRN      pantoprazole 40 MG EC tablet  Commonly known as: PROTONIX   40 mg, Oral, Daily      prazosin 2 MG capsule  Commonly known as: MINIPRESS   8 mg, Oral, Nightly      QUEtiapine  MG 24 hr tablet  Commonly known as: SEROquel  XR   200 mg, Nightly      rosuvastatin 40 MG tablet  Commonly known as: CRESTOR   20 mg, Oral, Nightly      Sennosides 8.6 MG capsule   1 capsule, Oral, Nightly      sildenafil 100 MG tablet  Commonly known as: VIAGRA   100 mg, Oral, As Needed      traZODone 100 MG tablet  Commonly known as: DESYREL   100 mg, Oral, Nightly               Allergies   Allergen Reactions    Bee Venom Anaphylaxis    Hydromorphone Unknown - High Severity    Morphine Anxiety and Hallucinations    Oxycodone-Acetaminophen Swelling    Sulfa Antibiotics Shortness Of Breath    Wasp Venom Unknown - High Severity    Codeine Unknown - High Severity    Lamotrigine Other (See Comments)    Lithium Unknown - Low Severity    Oxycodone Unknown - Low Severity    Oxycodone Hcl Unknown - Low Severity    Penicillins Rash       Discharge Disposition:  Home or Self Care    Diet:  Hospital:  Diet Order   Procedures    Diet: Regular/House Diet; Texture: Regular Texture (IDDSI 7); Fluid Consistency: Thin (IDDSI 0)       Discharge Activity:   Activity Instructions       Gradually Increase Activity Until at Pre-Hospitalization Level              CODE STATUS:  Code Status and Medical Interventions:   Ordered at: 01/01/24 0916     Code Status (Patient has no pulse and is not breathing):    CPR (Attempt to Resuscitate)     Medical Interventions (Patient has pulse or is breathing):    Full Support         Future Appointments   Date Time Provider Department Center   1/10/2024  7:00 AM Tatum Cruz APRN [facility]       Additional Instructions for the Follow-ups that You Need to Schedule       Discharge Follow-up with PCP   As directed       Currently Documented PCP:    Raheem Tobar MD    PCP Phone Number:    [phone]     Follow Up Details: Hospital discharge follow-up 1 to 2 weeks bowel obstruction suspected due to Crohn's        Discharge Follow-up with Specialty: [facility] as scheduled   As directed      Specialty:  [facility] as scheduled   Follow Up Details: Hospital discharge follow-up bowel obstruction concern due to Crohn's flare                Pertinent  and/or Most Recent Results     PROCEDURES:   None    LAB RESULTS:      Lab 01/03/24  0501 01/02/24  0553 01/01/24  2346 01/01/24  1747 01/01/24  1453 01/01/24  1156 01/01/24  0730 01/01/24  0540   WBC 6.59 8.30  --   --   --   --   --  12.26*   HEMOGLOBIN 14.2 14.8  --   --   --   --   --  16.8   HEMATOCRIT 42.6 44.1  --   --   --   --   --  48.7   PLATELETS 195 195  --   --   --   --   --  253   NEUTROS ABS 3.94 5.76  --   --   --   --   --  9.54*   IMMATURE GRANS (ABS) 0.01 0.02  --   --   --   --   --  0.06*   LYMPHS ABS 1.57 1.23  --   --   --   --   --  1.59   MONOS ABS 1.06* 1.18*  --   --   --   --   --  0.91*   EOS ABS 0.00 0.07  --   --   --   --   --  0.13   MCV 85.7 84.6  --   --   --   --   --  83.1   LACTATE  --  2.4* 4.1* 4.2* 3.5* 3.0*   < >  --     < > = values in this interval not displayed.         Lab 01/03/24  0501 01/02/24  1630 01/02/24  0553 01/01/24  0540   SODIUM 136 137 134* 138   POTASSIUM 4.2 4.6 5.4* 5.0   CHLORIDE 103 102 103 99   CO2 21.9* 22.8 18.3* 23.7   ANION GAP 11.1 12.2 12.7 15.3*   BUN 21 20 19 11   CREATININE 1.27 1.39* 1.24 1.23   EGFR 64.7 58.0* 66.6 67.2   GLUCOSE 137* 159* 144* 153*   CALCIUM 8.6 9.1 8.8 9.5   MAGNESIUM 1.7  --  1.9  --    PHOSPHORUS 3.7  --  3.1  --    HEMOGLOBIN A1C  --   --  7.00*  --          Lab 01/03/24  0501 01/02/24  0553 01/01/24  0540   TOTAL PROTEIN 6.1 6.4 7.5   ALBUMIN 3.6 3.7 4.4   GLOBULIN 2.5 2.7 3.1   ALT (SGPT) 20 27 41   AST (SGOT) 19 26 34   BILIRUBIN 0.4 0.5 0.6   ALK PHOS 43 50 69   LIPASE  --   --  26         Lab 01/01/24  0540   HSTROP T 9                 Brief Urine Lab Results  (Last result in the past 365 days)        Color   Clarity   Blood   Leuk Est   Nitrite   Protein   CREAT   Urine HCG        01/01/24 0813 Yellow   Clear   Negative   Negative    Negative   Trace                 Microbiology Results (last 10 days)       Procedure Component Value - Date/Time    Blood Culture - Blood, Arm, Left [[id number]]  (Normal) Collected: 01/01/24 0730    Lab Status: Preliminary result Specimen: Blood from Arm, Left Updated: 01/05/24 0746     Blood Culture No growth at 4 days    Blood Culture - Blood, Arm, Right [[id number]]  (Normal) Collected: 01/01/24 0730    Lab Status: Preliminary result Specimen: Blood from Arm, Right Updated: 01/05/24 0746     Blood Culture No growth at 4 days            CT Abdomen Pelvis With Contrast    Result Date: 1/4/2024  Impression:  Favorable progression is noted with progression of the oral contrast agent to the colon, which is against a complete small bowel obstruction.  There is also thought to be some degree of interval decompression of the dilated proximal small bowel since the prior 1/1/2024 CT study.  There is a nasogastric (NG) tube in place.  Please see above comments for further detail.   Please note that portions of this note were completed with a voice recognition program.  MORTEZA PINEDO JR, MD       Electronically Signed and Approved By: MORTEZA PINEDO JR, MD on 1/04/2024 at 5:51              XR Abdomen KUB    Result Date: 1/2/2024  Impression:  A persistent mechanical small bowel obstruction is suspected.  No significant change in the nasogastric (NG) tube position.     Please note that portions of this note were completed with a voice recognition program.  MORTEZA PINEDO JR, MD       Electronically Signed and Approved By: MORTEZA PINEDO JR, MD on 1/02/2024 at 4:14              XR Abdomen KUB    Result Date: 1/1/2024  Impression:   KUB demonstrating NG tube in the stomach, the tip is 14 cm beyond the GE junction.     OWEN MEYER MD       Electronically Signed and Approved By: OWEN MEYER MD on 1/01/2024 at 15:07             CT Abdomen Pelvis Without Contrast    Result Date: 1/1/2024  Impression:  A mechanical bowel  obstruction is suspected, as discussed.  No pneumoperitoneum or pneumatosis.  No definite pericolonic or perienteric fluid collection is seen to suggest abscess.  Please see above comments for further detail.    Please note that portions of this note were completed with a voice recognition program.  MORTEZA PINEDO JR, MD       Electronically Signed and Approved By: MORTEZA PINEDO JR, MD on 1/01/2024 at 6:51                          Labs Pending at Discharge:  Pending Labs       Order Current Status    Blood Culture - Blood, Arm, Left Preliminary result    Blood Culture - Blood, Arm, Right Preliminary result              Time spent on Discharge including face to face service: Greater than 35 minutes    Electronically signed by Ebenezer Rivera MD, 01/05/24, 12:09 PM EST.

## 2024-01-05 NOTE — PLAN OF CARE
Goal Outcome Evaluation:      Patient discharging home this shift. Able to tolerate a diet. No new issues, or complaints of pain and nausea. VSS

## 2024-01-05 NOTE — PROGRESS NOTES
[facility]     Progress Note    Patient Name: Chai Gaston  : 1965  MRN: [id number]  Primary Care Physician:  Raheem Tobar MD  Date of admission: 2024    Subjective   Subjective     Chief Complaint: Abdominal pain    HPI:  Patient Reports feeling well today.  No nausea or vomiting.  Tolerating diet.  Passing flatus.      Objective   Objective     Vitals:   Temp:  [97.5 °F (36.4 °C)-98.3 °F (36.8 °C)] 98.1 °F (36.7 °C)  Heart Rate:  [80-98] 84  Resp:  [18] 18  BP: (129-147)/(75-89) 139/89    Physical Exam  Constitutional:       Appearance: Normal appearance.   Cardiovascular:      Rate and Rhythm: Normal rate.   Pulmonary:      Effort: Pulmonary effort is normal.   Abdominal:      Palpations: Abdomen is soft.         Result Review    Result Review:  I have personally reviewed the results from the time of this admission to 2024 12:17 EST and agree with these findings:  []  Laboratory  []  Microbiology  []  Radiology  []  EKG/Telemetry   []  Cardiology/Vascular   []  Pathology  []  Old records  []  Other:      Assessment & Plan   Assessment / Plan     Brief Patient Summary:  Chai Gaston is a 58 y.o. male who has resolved small bowel obstruction    Active Hospital Problems:  Active Hospital Problems    Diagnosis     **SBO (small bowel obstruction)      Plan:  Okay for discharge from surgical perspective  Does not necessarily need to follow-up with me unless there is an issue  [facility]       DVT prophylaxis:  Mechanical DVT prophylaxis orders are present.    CODE STATUS:   Code Status (Patient has no pulse and is not breathing): CPR (Attempt to Resuscitate)  Medical Interventions (Patient has pulse or is breathing): Full Support    Disposition:  I expect patient to be discharged today.    Electronically signed by Julio Cesar Vivas MD, 24, 12:17 PM EST.

## 2024-01-05 NOTE — PROGRESS NOTES
[facility]   Hospitalist Progress Note  Date: 2024  Patient Name: Chai Gaston  : 1965  MRN: [id number]  Date of admission: 2024      Subjective   Subjective     Chief Complaint: Follow-up abdominal pain    Summary:Chai Gaston is a 58 y.o. male past medical history significant for Crohn's, multiple small bowel obstructions who presents to the hospital with a 1 day history of abdominal pain, nausea and vomiting.  Patient states that several days ago he began having diarrhea, this subsequently has stopped, patient is not passing any gas.  Patient's abdomen is, he was evaluated by the emergency department where he underwent CT scan of the abdomen which was concerning for SBO.  General surgery and gastroenterology was consulted and the hospitalist service is asked to admit for further workup and management.  Patient started on empiric antibiotics and IV steroids.  Made n.p.o. and NG tube placed to suction.  Bowel function slowly returned.    Interval Followup: Patient sitting up in bedside chair appears to be resting fairly comfortably.  Still having some intermittent abdominal pain though improved.  Patient had several bowel movements overnight.  Passing flatus.  NG removed.  Tolerating clears.  Afebrile heart rate slightly elevated.  Blood pressure within normal limits.  Satting well on room air.  Blood sugars within normal limits.  No other issues per nursing.    Review of Systems  Constitutional: Negative for fatigue and fever.   HENT: Negative for sore throat and trouble swallowing.    Eyes: Negative for pain and discharge.   Respiratory: Negative for cough and shortness of breath.    Cardiovascular: Negative for chest pain and palpitations.   Gastrointestinal: Positive for abdominal pain, nausea and vomiting.   Endocrine: Negative for cold intolerance and heat intolerance.   Genitourinary: Negative for difficulty urinating and dysuria.   Musculoskeletal: Negative for back pain  and neck stiffness.   Skin: Negative for color change and rash.   Neurological: Negative for syncope and headaches.   Hematological: Negative for adenopathy.   Psychiatric/Behavioral: Negative for confusion and hallucinations.    Objective   Objective     Vitals:   Temp:  [97.5 °F (36.4 °C)-98.2 °F (36.8 °C)] 98.2 °F (36.8 °C)  Heart Rate:  [85-98] 98  Resp:  [18-20] 18  BP: (117-147)/(65-86) 147/82  Physical Exam   General: well-developed appearing stated age in no acute distress  HEENT: Normocephalic atraumatic moist membranes pupils equal round reactive light, no scleral icterus no conjunctival injection  Cardiovascular: regular rate and rhythm no murmurs rubs or gallops S1-S2, no lower extremity edema appreciated  Pulmonary: Clear to auscultation bilaterally no wheezes rales or rhonchi symmetric chest expansion, unlabored, no conversational dyspnea appreciated  Gastrointestinal: Soft nontender nondistended positive bowel sounds all 4 quadrants no rebound or guarding  Musculoskeletal: No clubbing cyanosis, warm and well-perfused, calves soft symmetric nontender bilaterally  Skin: Clean dry without rashes  Neuro: Cranial nerves II through XII intact grossly no sensorimotor deficits appreciated bilateral upper and lower extremities  Psych: Patient is calm cooperative and appropriate with exam not responding to internal stimuli  : No Cote catheter no bladder distention no suprapubic tenderness    Result Review    Result Review:  I have personally reviewed these results and agree with these findings:  [x]  Laboratory  LAB RESULTS:      Lab 01/03/24  0501 01/02/24  0553 01/01/24  2346 01/01/24  1747 01/01/24  1453 01/01/24  1156 01/01/24  0730 01/01/24  0540   WBC 6.59 8.30  --   --   --   --   --  12.26*   HEMOGLOBIN 14.2 14.8  --   --   --   --   --  16.8   HEMATOCRIT 42.6 44.1  --   --   --   --   --  48.7   PLATELETS 195 195  --   --   --   --   --  253   NEUTROS ABS 3.94 5.76  --   --   --   --   --  9.54*    IMMATURE GRANS (ABS) 0.01 0.02  --   --   --   --   --  0.06*   LYMPHS ABS 1.57 1.23  --   --   --   --   --  1.59   MONOS ABS 1.06* 1.18*  --   --   --   --   --  0.91*   EOS ABS 0.00 0.07  --   --   --   --   --  0.13   MCV 85.7 84.6  --   --   --   --   --  83.1   LACTATE  --  2.4* 4.1* 4.2* 3.5* 3.0*   < >  --     < > = values in this interval not displayed.         Lab 01/03/24  0501 01/02/24  1630 01/02/24  0553 01/01/24  0540   SODIUM 136 137 134* 138   POTASSIUM 4.2 4.6 5.4* 5.0   CHLORIDE 103 102 103 99   CO2 21.9* 22.8 18.3* 23.7   ANION GAP 11.1 12.2 12.7 15.3*   BUN 21 20 19 11   CREATININE 1.27 1.39* 1.24 1.23   EGFR 64.7 58.0* 66.6 67.2   GLUCOSE 137* 159* 144* 153*   CALCIUM 8.6 9.1 8.8 9.5   MAGNESIUM 1.7  --  1.9  --    PHOSPHORUS 3.7  --  3.1  --    HEMOGLOBIN A1C  --   --  7.00*  --          Lab 01/03/24  0501 01/02/24  0553 01/01/24  0540   TOTAL PROTEIN 6.1 6.4 7.5   ALBUMIN 3.6 3.7 4.4   GLOBULIN 2.5 2.7 3.1   ALT (SGPT) 20 27 41   AST (SGOT) 19 26 34   BILIRUBIN 0.4 0.5 0.6   ALK PHOS 43 50 69   LIPASE  --   --  26         Lab 01/01/24  0540   HSTROP T 9                 Brief Urine Lab Results  (Last result in the past 365 days)        Color   Clarity   Blood   Leuk Est   Nitrite   Protein   CREAT   Urine HCG        01/01/24 0813 Yellow   Clear   Negative   Negative   Negative   Trace                 Microbiology Results (last 10 days)       Procedure Component Value - Date/Time    Blood Culture - Blood, Arm, Left [[id number]]  (Normal) Collected: 01/01/24 0730    Lab Status: Preliminary result Specimen: Blood from Arm, Left Updated: 01/04/24 0745     Blood Culture No growth at 3 days    Blood Culture - Blood, Arm, Right [[id number]]  (Normal) Collected: 01/01/24 0730    Lab Status: Preliminary result Specimen: Blood from Arm, Right Updated: 01/04/24 0745     Blood Culture No growth at 3 days            [x]  Microbiology  [x]  Radiology  CT Abdomen Pelvis With Contrast    Result Date:  1/4/2024   Favorable progression is noted with progression of the oral contrast agent to the colon, which is against a complete small bowel obstruction.  There is also thought to be some degree of interval decompression of the dilated proximal small bowel since the prior 1/1/2024 CT study.  There is a nasogastric (NG) tube in place.  Please see above comments for further detail.   Please note that portions of this note were completed with a voice recognition program.  MORTEZA PINEDO JR, MD       Electronically Signed and Approved By: MORTEZA PINEDO JR, MD on 1/04/2024 at 5:51              XR Abdomen KUB    Result Date: 1/2/2024   A persistent mechanical small bowel obstruction is suspected.  No significant change in the nasogastric (NG) tube position.     Please note that portions of this note were completed with a voice recognition program.  MORTEZA PINEDO JR, MD       Electronically Signed and Approved By: MORTEZA PINEDO JR, MD on 1/02/2024 at 4:14              XR Abdomen KUB    Result Date: 1/1/2024    KUB demonstrating NG tube in the stomach, the tip is 14 cm beyond the GE junction.     OWEN MEYER MD       Electronically Signed and Approved By: OWEN MEYER MD on 1/01/2024 at 15:07             CT Abdomen Pelvis Without Contrast    Result Date: 1/1/2024   A mechanical bowel obstruction is suspected, as discussed.  No pneumoperitoneum or pneumatosis.  No definite pericolonic or perienteric fluid collection is seen to suggest abscess.  Please see above comments for further detail.    Please note that portions of this note were completed with a voice recognition program.  MORTEZA PINEDO JR, MD       Electronically Signed and Approved By: MORTEZA PINEDO JR, MD on 1/01/2024 at 6:51              []  EKG/Telemetry   []  Cardiology/Vascular   []  Pathology  []  Old records  [x]  Other:  Scheduled Meds:cefTRIAXone, 2,000 mg, Intravenous, Q24H  fluticasone, 2 spray, Nasal, Daily  methylPREDNISolone sodium succinate,  40 mg, Intravenous, Daily  metroNIDAZOLE, 500 mg, Intravenous, Q8H  [START ON 1/5/2024] multivitamin with minerals, 1 tablet, Oral, Daily  pantoprazole, 40 mg, Intravenous, Q AM  QUEtiapine, 100 mg, Oral, BID  senna-docusate sodium, 2 tablet, Oral, BID  sodium chloride, 10 mL, Intravenous, Q12H  valproate sodium, 250 mg, Intravenous, Q6H      Continuous Infusions:lactated ringers, 150 mL/hr, Last Rate: 150 mL/hr (01/04/24 1455)      PRN Meds:.  senna-docusate sodium **AND** polyethylene glycol **AND** [DISCONTINUED] bisacodyl **AND** bisacodyl    melatonin    ondansetron    prochlorperazine    sodium chloride    sodium chloride    sodium chloride      Assessment & Plan   Assessment / Plan     Assessment/Plan:  Small bowel obstruction suspect due to Crohn's disease  Crohn's disease  Depression  Diabetes mellitus  Hyperlipidemia  Hypertriglyceridemia  History of MI  PTSD  Multiple abdominal surgeries  Hyperkalemia  Lactic acidosis          Patient admitted for further evaluation and treatment  General surgery and gastroenterology consulted thank you for your assistance  Advance diet to clears  Continue IV fluids at low rate  Continue ceftriaxone and metronidazole  Continue Solu-Medrol and taper per gastroenterology  Continues Seroquel  Continue valproate  Electrolytes within normal limits following replacement  Encourage increased activity  Further inpatient orders recommendations pending clinical course         Discussed plan with bedside RN and Dr. Vivas General surgery.    Disposition: Home once tolerating a diet    DVT prophylaxis:  Mechanical DVT prophylaxis orders are present.    CODE STATUS:   Code Status (Patient has no pulse and is not breathing): CPR (Attempt to Resuscitate)  Medical Interventions (Patient has pulse or is breathing): Full Support

## 2024-01-06 LAB
BACTERIA SPEC AEROBE CULT: NORMAL
BACTERIA SPEC AEROBE CULT: NORMAL

## 2024-01-06 NOTE — OUTREACH NOTE
Prep Survey      Flowsheet Row Responses   [facility] facility patient discharged from? [facility]   Is LACE score < 7 ? No   Eligibility Readm Mgmt   Discharge diagnosis Small bowel obstruction suspect due to Crohn's disease   Does the patient have one of the following disease processes/diagnoses(primary or secondary)? Other   Does the patient have Home health ordered? No   Is there a DME ordered? No   Prep survey completed? Yes            ROSALIA ALANIZ - Registered Nurse

## 2024-01-10 ENCOUNTER — LAB (OUTPATIENT)
Dept: LAB | Facility: HOSPITAL | Age: 59
End: 2024-01-10
Payer: OTHER GOVERNMENT

## 2024-01-10 ENCOUNTER — OFFICE VISIT (OUTPATIENT)
Dept: FAMILY MEDICINE CLINIC | Facility: CLINIC | Age: 59
End: 2024-01-10
Payer: OTHER GOVERNMENT

## 2024-01-10 ENCOUNTER — READMISSION MANAGEMENT (OUTPATIENT)
Dept: CALL CENTER | Facility: HOSPITAL | Age: 59
End: 2024-01-10
Payer: OTHER GOVERNMENT

## 2024-01-10 VITALS
WEIGHT: 273 LBS | OXYGEN SATURATION: 96 % | DIASTOLIC BLOOD PRESSURE: 80 MMHG | BODY MASS INDEX: 36.98 KG/M2 | HEART RATE: 86 BPM | HEIGHT: 72 IN | SYSTOLIC BLOOD PRESSURE: 161 MMHG

## 2024-01-10 DIAGNOSIS — Z12.5 SCREENING FOR PROSTATE CANCER: ICD-10-CM

## 2024-01-10 DIAGNOSIS — E55.9 VITAMIN D DEFICIENCY: ICD-10-CM

## 2024-01-10 DIAGNOSIS — Z11.59 NEED FOR HEPATITIS C SCREENING TEST: ICD-10-CM

## 2024-01-10 DIAGNOSIS — I10 PRIMARY HYPERTENSION: ICD-10-CM

## 2024-01-10 DIAGNOSIS — Z13.29 SCREENING FOR THYROID DISORDER: ICD-10-CM

## 2024-01-10 DIAGNOSIS — F51.01 PRIMARY INSOMNIA: ICD-10-CM

## 2024-01-10 DIAGNOSIS — E78.5 HYPERLIPIDEMIA, UNSPECIFIED HYPERLIPIDEMIA TYPE: ICD-10-CM

## 2024-01-10 DIAGNOSIS — K21.9 GASTROESOPHAGEAL REFLUX DISEASE WITHOUT ESOPHAGITIS: ICD-10-CM

## 2024-01-10 DIAGNOSIS — Z00.00 ANNUAL PHYSICAL EXAM: Primary | ICD-10-CM

## 2024-01-10 DIAGNOSIS — N52.9 ERECTILE DYSFUNCTION, UNSPECIFIED ERECTILE DYSFUNCTION TYPE: ICD-10-CM

## 2024-01-10 DIAGNOSIS — Z00.00 ANNUAL PHYSICAL EXAM: ICD-10-CM

## 2024-01-10 LAB
CHOLEST SERPL-MCNC: 125 MG/DL (ref 0–200)
HCV AB SER DONR QL: NORMAL
HDLC SERPL-MCNC: 43 MG/DL (ref 40–60)
LDLC SERPL CALC-MCNC: 59 MG/DL (ref 0–100)
LDLC/HDLC SERPL: 1.32 {RATIO}
PSA SERPL-MCNC: 1.18 NG/ML (ref 0–4)
TRIGL SERPL-MCNC: 127 MG/DL (ref 0–150)
TSH SERPL DL<=0.05 MIU/L-ACNC: 3.67 UIU/ML (ref 0.27–4.2)
VLDLC SERPL-MCNC: 23 MG/DL (ref 5–40)

## 2024-01-10 PROCEDURE — 84443 ASSAY THYROID STIM HORMONE: CPT

## 2024-01-10 PROCEDURE — 36415 COLL VENOUS BLD VENIPUNCTURE: CPT

## 2024-01-10 PROCEDURE — 80061 LIPID PANEL: CPT

## 2024-01-10 PROCEDURE — 86803 HEPATITIS C AB TEST: CPT

## 2024-01-10 PROCEDURE — 99396 PREV VISIT EST AGE 40-64: CPT | Performed by: NURSE PRACTITIONER

## 2024-01-10 PROCEDURE — G0103 PSA SCREENING: HCPCS

## 2024-01-10 NOTE — OUTREACH NOTE
Medical Week 1 Survey      Flowsheet Row Responses   [facility] patient discharged from? [facility]   Does the patient have one of the following disease processes/diagnoses(primary or secondary)? Other   Week 1 attempt successful? Yes   Call start time 1833   Call end time 1836   Discharge diagnosis Small bowel obstruction suspect due to Crohn's disease   Meds reviewed with patient/caregiver? Yes   Is the patient having any side effects they believe may be caused by any medication additions or changes? No   Does the patient have all medications ordered at discharge? Yes   Is the patient taking all medications as directed (includes completed medication regime)? Yes   Does the patient have a primary care provider?  Yes   Has the patient kept scheduled appointments due by today? Yes   Psychosocial issues? No   What is the patient's perception of their health status since discharge? Improving   Is the patient/caregiver able to teach back signs and symptoms related to disease process for when to call PCP? Yes   Is the patient/caregiver able to teach back signs and symptoms related to disease process for when to call 911? Yes   Is the patient/caregiver able to teach back the hierarchy of who to call/visit for symptoms/problems? PCP, Specialist, Home health nurse, Urgent Care, ED, 911 Yes   If the patient is a current smoker, are they able to teach back resources for cessation? Not a smoker   Additional teach back comments States he is doing well and gave compliments on his care.   Week 1 call completed? Yes   Graduated Yes   Graduated/Revoked comments Denies questions or needs at this time   Call end time 1836            Sheree SOUSA - Licensed Nurse
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
## 2024-04-09 ENCOUNTER — PATIENT ROUNDING (BHMG ONLY) (OUTPATIENT)
Dept: URGENT CARE | Facility: CLINIC | Age: 59
End: 2024-04-09
Payer: OTHER GOVERNMENT

## 2024-04-09 NOTE — ED NOTES
Thank you for letting us care for you in your recent visit to our urgent care center. We would love to hear about your experience with us. Was this the first time you have visited our location?    We’re always looking for ways to make our patients’ experiences even better. Do you have any recommendations on ways we may improve?     I appreciate you taking the time to respond. Please be on the lookout for a survey about your recent visit from [email or web address] via text or email. We would greatly appreciate if you could fill that out and turn it back in. We want your voice to be heard and we value your feedback.   Thank you for choosing [facility] for your healthcare needs.

## 2024-07-31 ENCOUNTER — HOSPITAL ENCOUNTER (OUTPATIENT)
Dept: GENERAL RADIOLOGY | Facility: HOSPITAL | Age: 59
Discharge: HOME | End: 2024-07-31
Admitting: NURSE PRACTITIONER
Payer: OTHER GOVERNMENT

## 2024-07-31 ENCOUNTER — OFFICE VISIT (OUTPATIENT)
Dept: FAMILY MEDICINE CLINIC | Facility: CLINIC | Age: 59
End: 2024-07-31
Payer: OTHER GOVERNMENT

## 2024-07-31 VITALS
HEIGHT: 72 IN | BODY MASS INDEX: 34.81 KG/M2 | OXYGEN SATURATION: 97 % | TEMPERATURE: 97.8 F | SYSTOLIC BLOOD PRESSURE: 122 MMHG | HEART RATE: 80 BPM | DIASTOLIC BLOOD PRESSURE: 75 MMHG | WEIGHT: 257 LBS

## 2024-07-31 DIAGNOSIS — R05.1 ACUTE COUGH: ICD-10-CM

## 2024-07-31 DIAGNOSIS — U07.1 COVID: ICD-10-CM

## 2024-07-31 DIAGNOSIS — U07.1 COVID: Primary | ICD-10-CM

## 2024-07-31 LAB
EXPIRATION DATE: ABNORMAL
FLUAV AG UPPER RESP QL IA.RAPID: NOT DETECTED
FLUBV AG UPPER RESP QL IA.RAPID: NOT DETECTED
INTERNAL CONTROL: ABNORMAL
Lab: ABNORMAL
SARS-COV-2 AG UPPER RESP QL IA.RAPID: DETECTED

## 2024-07-31 PROCEDURE — 99213 OFFICE O/P EST LOW 20 MIN: CPT | Performed by: NURSE PRACTITIONER

## 2024-07-31 PROCEDURE — 71046 X-RAY EXAM CHEST 2 VIEWS: CPT

## 2024-07-31 PROCEDURE — 87428 SARSCOV & INF VIR A&B AG IA: CPT | Performed by: NURSE PRACTITIONER

## 2024-07-31 RX ORDER — BENZONATATE 100 MG/1
100 CAPSULE ORAL 3 TIMES DAILY PRN
Qty: 30 CAPSULE | Refills: 0 | Status: SHIPPED | OUTPATIENT
Start: 2024-07-31

## 2024-07-31 RX ORDER — AZITHROMYCIN 250 MG/1
TABLET, FILM COATED ORAL
Qty: 6 TABLET | Refills: 0 | Status: SHIPPED | OUTPATIENT
Start: 2024-07-31
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
## 2024-10-04 ENCOUNTER — APPOINTMENT (OUTPATIENT)
Dept: CT IMAGING | Facility: HOSPITAL | Age: 59
End: 2024-10-04
Payer: OTHER GOVERNMENT

## 2024-10-04 ENCOUNTER — HOSPITAL ENCOUNTER (EMERGENCY)
Facility: HOSPITAL | Age: 59
Discharge: HOME | End: 2024-10-04
Attending: EMERGENCY MEDICINE
Payer: OTHER GOVERNMENT

## 2024-10-04 VITALS
SYSTOLIC BLOOD PRESSURE: 140 MMHG | HEIGHT: 72 IN | TEMPERATURE: 98.6 F | DIASTOLIC BLOOD PRESSURE: 91 MMHG | OXYGEN SATURATION: 95 % | WEIGHT: 254.85 LBS | HEART RATE: 84 BPM | BODY MASS INDEX: 34.52 KG/M2 | RESPIRATION RATE: 18 BRPM

## 2024-10-04 DIAGNOSIS — R30.0 DYSURIA: Primary | ICD-10-CM

## 2024-10-04 LAB
ALBUMIN SERPL-MCNC: 4 G/DL (ref 3.5–5.2)
ALBUMIN/GLOB SERPL: 1.4 G/DL
ALP SERPL-CCNC: 53 U/L (ref 39–117)
ALT SERPL W P-5'-P-CCNC: 18 U/L (ref 1–41)
ANION GAP SERPL CALCULATED.3IONS-SCNC: 12.5 MMOL/L (ref 5–15)
AST SERPL-CCNC: 17 U/L (ref 1–40)
BASOPHILS # BLD AUTO: 0.03 10*3/MM3 (ref 0–0.2)
BASOPHILS NFR BLD AUTO: 0.5 % (ref 0–1.5)
BILIRUB SERPL-MCNC: 0.4 MG/DL (ref 0–1.2)
BILIRUB UR QL STRIP: NEGATIVE
BUN SERPL-MCNC: 14 MG/DL (ref 8–23)
BUN/CREAT SERPL: 11.3 (ref 7–25)
CALCIUM SPEC-SCNC: 9.2 MG/DL (ref 8.6–10.5)
CHLORIDE SERPL-SCNC: 103 MMOL/L (ref 98–107)
CLARITY UR: CLEAR
CO2 SERPL-SCNC: 23.5 MMOL/L (ref 22–29)
COLOR UR: YELLOW
CREAT SERPL-MCNC: 1.24 MG/DL (ref 0.76–1.27)
D-LACTATE SERPL-SCNC: 1.5 MMOL/L (ref 0.5–2)
DEPRECATED RDW RBC AUTO: 38.7 FL (ref 37–54)
EGFRCR SERPLBLD CKD-EPI 2021: 66.1 ML/MIN/1.73
EOSINOPHIL # BLD AUTO: 0.13 10*3/MM3 (ref 0–0.4)
EOSINOPHIL NFR BLD AUTO: 2.2 % (ref 0.3–6.2)
ERYTHROCYTE [DISTWIDTH] IN BLOOD BY AUTOMATED COUNT: 12.2 % (ref 12.3–15.4)
GLOBULIN UR ELPH-MCNC: 2.8 GM/DL
GLUCOSE SERPL-MCNC: 107 MG/DL (ref 65–99)
GLUCOSE UR STRIP-MCNC: ABNORMAL MG/DL
HCT VFR BLD AUTO: 48.2 % (ref 37.5–51)
HGB BLD-MCNC: 16.2 G/DL (ref 13–17.7)
HGB UR QL STRIP.AUTO: NEGATIVE
HOLD SPECIMEN: NORMAL
HOLD SPECIMEN: NORMAL
IMM GRANULOCYTES # BLD AUTO: 0.02 10*3/MM3 (ref 0–0.05)
IMM GRANULOCYTES NFR BLD AUTO: 0.3 % (ref 0–0.5)
KETONES UR QL STRIP: ABNORMAL
LEUKOCYTE ESTERASE UR QL STRIP.AUTO: NEGATIVE
LIPASE SERPL-CCNC: 34 U/L (ref 13–60)
LYMPHOCYTES # BLD AUTO: 2.06 10*3/MM3 (ref 0.7–3.1)
LYMPHOCYTES NFR BLD AUTO: 34.2 % (ref 19.6–45.3)
MCH RBC QN AUTO: 29 PG (ref 26.6–33)
MCHC RBC AUTO-ENTMCNC: 33.6 G/DL (ref 31.5–35.7)
MCV RBC AUTO: 86.4 FL (ref 79–97)
MONOCYTES # BLD AUTO: 0.46 10*3/MM3 (ref 0.1–0.9)
MONOCYTES NFR BLD AUTO: 7.6 % (ref 5–12)
NEUTROPHILS NFR BLD AUTO: 3.33 10*3/MM3 (ref 1.7–7)
NEUTROPHILS NFR BLD AUTO: 55.2 % (ref 42.7–76)
NITRITE UR QL STRIP: NEGATIVE
NRBC BLD AUTO-RTO: 0 /100 WBC (ref 0–0.2)
PH UR STRIP.AUTO: 6 [PH] (ref 5–8)
PLATELET # BLD AUTO: 196 10*3/MM3 (ref 140–450)
PMV BLD AUTO: 9.1 FL (ref 6–12)
POTASSIUM SERPL-SCNC: 4.4 MMOL/L (ref 3.5–5.2)
PROT SERPL-MCNC: 6.8 G/DL (ref 6–8.5)
PROT UR QL STRIP: NEGATIVE
RBC # BLD AUTO: 5.58 10*6/MM3 (ref 4.14–5.8)
SODIUM SERPL-SCNC: 139 MMOL/L (ref 136–145)
SP GR UR STRIP: >=1.03 (ref 1–1.03)
UROBILINOGEN UR QL STRIP: ABNORMAL
WBC NRBC COR # BLD AUTO: 6.03 10*3/MM3 (ref 3.4–10.8)
WHOLE BLOOD HOLD COAG: NORMAL
WHOLE BLOOD HOLD SPECIMEN: NORMAL

## 2024-10-04 PROCEDURE — 81003 URINALYSIS AUTO W/O SCOPE: CPT | Performed by: EMERGENCY MEDICINE

## 2024-10-04 PROCEDURE — 80053 COMPREHEN METABOLIC PANEL: CPT | Performed by: EMERGENCY MEDICINE

## 2024-10-04 PROCEDURE — 99285 EMERGENCY DEPT VISIT HI MDM: CPT

## 2024-10-04 PROCEDURE — 83690 ASSAY OF LIPASE: CPT | Performed by: EMERGENCY MEDICINE

## 2024-10-04 PROCEDURE — 85025 COMPLETE CBC W/AUTO DIFF WBC: CPT | Performed by: EMERGENCY MEDICINE

## 2024-10-04 PROCEDURE — 96360 HYDRATION IV INFUSION INIT: CPT

## 2024-10-04 PROCEDURE — 74177 CT ABD & PELVIS W/CONTRAST: CPT

## 2024-10-04 PROCEDURE — 25510000001 IOPAMIDOL PER 1 ML: Performed by: EMERGENCY MEDICINE

## 2024-10-04 PROCEDURE — 87086 URINE CULTURE/COLONY COUNT: CPT

## 2024-10-04 PROCEDURE — 83605 ASSAY OF LACTIC ACID: CPT | Performed by: EMERGENCY MEDICINE

## 2024-10-04 PROCEDURE — 25810000003 SODIUM CHLORIDE 0.9 % SOLUTION: Performed by: EMERGENCY MEDICINE

## 2024-10-04 RX ORDER — SODIUM CHLORIDE 0.9 % (FLUSH) 0.9 %
10 SYRINGE (ML) INJECTION AS NEEDED
Status: DISCONTINUED | OUTPATIENT
Start: 2024-10-04 | End: 2024-10-04 | Stop reason: HOSPADM

## 2024-10-04 RX ORDER — IOPAMIDOL 755 MG/ML
100 INJECTION, SOLUTION INTRAVASCULAR
Status: COMPLETED | OUTPATIENT
Start: 2024-10-04 | End: 2024-10-04

## 2024-10-04 RX ADMIN — SODIUM CHLORIDE 1000 ML: 9 INJECTION, SOLUTION INTRAVENOUS at 10:40

## 2024-10-04 RX ADMIN — IOPAMIDOL 100 ML: 755 INJECTION, SOLUTION INTRAVENOUS at 10:31
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
## 2024-10-04 NOTE — Clinical Note
[facility] EMERGENCY ROOM  [address] [place] [address]  Phone: [phone]  Fax: [phone]    Chai Gaston was seen and treated in our emergency department on 10/4/2024.  He may return to work on 10/06/2024.         Thank you for choosing [facility].    Cheko Marvin MD

## 2024-10-15 ENCOUNTER — LAB (OUTPATIENT)
Dept: LAB | Facility: HOSPITAL | Age: 59
End: 2024-10-15
Payer: OTHER GOVERNMENT

## 2024-10-15 ENCOUNTER — OFFICE VISIT (OUTPATIENT)
Dept: FAMILY MEDICINE CLINIC | Facility: CLINIC | Age: 59
End: 2024-10-15
Payer: OTHER GOVERNMENT

## 2024-10-15 ENCOUNTER — DOCUMENTATION (OUTPATIENT)
Dept: PHYSICAL THERAPY | Facility: CLINIC | Age: 59
End: 2024-10-15
Payer: OTHER GOVERNMENT

## 2024-10-15 VITALS
HEART RATE: 80 BPM | DIASTOLIC BLOOD PRESSURE: 85 MMHG | OXYGEN SATURATION: 95 % | SYSTOLIC BLOOD PRESSURE: 137 MMHG | WEIGHT: 259 LBS | HEIGHT: 72 IN | BODY MASS INDEX: 35.08 KG/M2

## 2024-10-15 DIAGNOSIS — E11.9 TYPE 2 DIABETES MELLITUS WITHOUT COMPLICATION, WITHOUT LONG-TERM CURRENT USE OF INSULIN: ICD-10-CM

## 2024-10-15 DIAGNOSIS — E11.9 TYPE 2 DIABETES MELLITUS WITHOUT COMPLICATION, WITHOUT LONG-TERM CURRENT USE OF INSULIN: Primary | ICD-10-CM

## 2024-10-15 DIAGNOSIS — R30.0 DYSURIA: ICD-10-CM

## 2024-10-15 DIAGNOSIS — R35.0 URINARY FREQUENCY: ICD-10-CM

## 2024-10-15 LAB
ALBUMIN UR-MCNC: <1.2 MG/DL
HBA1C MFR BLD: 5.6 % (ref 4.8–5.6)

## 2024-10-15 PROCEDURE — 99214 OFFICE O/P EST MOD 30 MIN: CPT | Performed by: NURSE PRACTITIONER

## 2024-10-15 PROCEDURE — 82043 UR ALBUMIN QUANTITATIVE: CPT

## 2024-10-15 PROCEDURE — 83036 HEMOGLOBIN GLYCOSYLATED A1C: CPT

## 2024-10-15 PROCEDURE — 36415 COLL VENOUS BLD VENIPUNCTURE: CPT

## 2024-10-15 NOTE — PROGRESS NOTES
Outpatient Physical Therapy  [address] [place] [address]      Discharge Summary  Discharge Summary from Physical Therapy Report      Dates  PT visit: 9/29/22-10/24/22  Number of Visits: 7       Goals  Plan Goals: 1. Mobility: Walking/Moving Around Functional Limitation                       LTG 1: 12 weeks:  The patient will demonstrate 50% limitation by achieving a score of 50 on the Dizziness Handicap Inventory.   STATUS:  New   STG 1a: 6 weeks:  The patient will demonstrate 60% limitation by achieving a score of 60 on the Dizziness Handicap Inventory.     STATUS:  New      2. The patient has difficulty with balance.   LTG 2: 12 weeks: The patient will hold the romberg position on foam with eyes open for 30 seconds in order to improve balance and decrease risk of falling.   STATUS: New   STG 2: 6 weeks: The patient will hold the romberg position on floor with eyes closed for 15 seconds in order to improve balance and decrease risk of falling.   STATUS: New     TREATMENT:  Manual therapy, neuromuscular re-education, gait training, canalith repositioning maneuver, therapeutic exercise, home exercise instruction, and modalities as needed to include: moist heat, electrical stimulation, and ultrasound.     Discharge Plan: Continue with current home exercise program as instructed    Date of Discharge 10/15/2024      Electronically signed:   Qi Umanzor PTA  Physical Therapist Assistant  [facility] License #: [id number]
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
## 2024-10-22 ENCOUNTER — OFFICE VISIT (OUTPATIENT)
Dept: UROLOGY | Facility: CLINIC | Age: 59
End: 2024-10-22
Payer: OTHER GOVERNMENT

## 2024-10-22 VITALS
SYSTOLIC BLOOD PRESSURE: 139 MMHG | HEART RATE: 115 BPM | WEIGHT: 261 LBS | BODY MASS INDEX: 35.35 KG/M2 | TEMPERATURE: 98.7 F | HEIGHT: 72 IN | DIASTOLIC BLOOD PRESSURE: 108 MMHG

## 2024-10-22 DIAGNOSIS — R35.0 INCREASED URINARY FREQUENCY: ICD-10-CM

## 2024-10-22 DIAGNOSIS — N41.8 OTHER PROSTATIC INFLAMMATORY DISEASES: Primary | ICD-10-CM

## 2024-10-22 DIAGNOSIS — N42.0 STONES, PROSTATE: ICD-10-CM

## 2024-10-22 DIAGNOSIS — R20.8 BURNING SENSATION: ICD-10-CM

## 2024-10-22 DIAGNOSIS — N50.89 TESTICULAR NODULE: ICD-10-CM

## 2024-10-22 DIAGNOSIS — Z87.19 HISTORY OF SMALL BOWEL OBSTRUCTION: ICD-10-CM

## 2024-10-22 PROBLEM — N34.2 OTHER URETHRITIS: Status: ACTIVE | Noted: 2024-10-22

## 2024-10-22 PROBLEM — N41.9 PROSTATITIS: Status: ACTIVE | Noted: 2024-10-22

## 2024-10-22 PROBLEM — N31.8 FREQUENCY-URGENCY SYNDROME: Status: ACTIVE | Noted: 2024-10-22

## 2024-10-22 LAB
BILIRUB BLD-MCNC: NEGATIVE MG/DL
CLARITY, POC: CLEAR
COLOR UR: YELLOW
EXPIRATION DATE: ABNORMAL
GLUCOSE UR STRIP-MCNC: ABNORMAL MG/DL
KETONES UR QL: NEGATIVE
LEUKOCYTE EST, POC: NEGATIVE
Lab: ABNORMAL
NITRITE UR-MCNC: NEGATIVE MG/ML
PH UR: 7.5 [PH] (ref 5–8)
PROT UR STRIP-MCNC: NEGATIVE MG/DL
RBC # UR STRIP: NEGATIVE /UL
SP GR UR: 1.02 (ref 1–1.03)
UROBILINOGEN UR QL: ABNORMAL

## 2024-10-22 RX ORDER — FLUCONAZOLE 150 MG/1
150 TABLET ORAL DAILY
Qty: 3 TABLET | Refills: 0 | Status: SHIPPED | OUTPATIENT
Start: 2024-10-22

## 2024-10-22 RX ORDER — DOXYCYCLINE 100 MG/1
100 CAPSULE ORAL 2 TIMES DAILY
Qty: 28 CAPSULE | Refills: 0 | Status: SHIPPED | OUTPATIENT
Start: 2024-10-22 | End: 2024-11-05
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
## 2024-11-08 ENCOUNTER — HOSPITAL ENCOUNTER (OUTPATIENT)
Dept: ULTRASOUND IMAGING | Facility: HOSPITAL | Age: 59
Discharge: HOME | End: 2024-11-08
Admitting: NURSE PRACTITIONER
Payer: OTHER GOVERNMENT

## 2024-11-08 DIAGNOSIS — N50.89 TESTICULAR NODULE: ICD-10-CM

## 2024-11-08 PROCEDURE — 76870 US EXAM SCROTUM: CPT

## 2024-11-12 ENCOUNTER — OFFICE VISIT (OUTPATIENT)
Dept: UROLOGY | Facility: CLINIC | Age: 59
End: 2024-11-12
Payer: OTHER GOVERNMENT

## 2024-11-12 VITALS
WEIGHT: 255 LBS | DIASTOLIC BLOOD PRESSURE: 77 MMHG | BODY MASS INDEX: 34.54 KG/M2 | SYSTOLIC BLOOD PRESSURE: 132 MMHG | HEART RATE: 90 BPM | HEIGHT: 72 IN | TEMPERATURE: 98.4 F

## 2024-11-12 DIAGNOSIS — N40.1 BENIGN PROSTATIC HYPERPLASIA WITH WEAK URINARY STREAM: Primary | ICD-10-CM

## 2024-11-12 DIAGNOSIS — N41.8 OTHER PROSTATIC INFLAMMATORY DISEASES: ICD-10-CM

## 2024-11-12 DIAGNOSIS — N42.0 STONES, PROSTATE: ICD-10-CM

## 2024-11-12 DIAGNOSIS — R39.12 BENIGN PROSTATIC HYPERPLASIA WITH WEAK URINARY STREAM: Primary | ICD-10-CM

## 2024-11-12 DIAGNOSIS — N43.3 HYDROCELE, RIGHT: ICD-10-CM

## 2024-11-12 LAB
BILIRUB BLD-MCNC: NEGATIVE MG/DL
CLARITY, POC: CLEAR
COLOR UR: YELLOW
EXPIRATION DATE: ABNORMAL
GLUCOSE UR STRIP-MCNC: ABNORMAL MG/DL
KETONES UR QL: NEGATIVE
LEUKOCYTE EST, POC: NEGATIVE
Lab: ABNORMAL
NITRITE UR-MCNC: NEGATIVE MG/ML
PH UR: 7.5 [PH] (ref 5–8)
PROT UR STRIP-MCNC: NEGATIVE MG/DL
RBC # UR STRIP: NEGATIVE /UL
SP GR UR: 1.02 (ref 1–1.03)
UROBILINOGEN UR QL: NORMAL

## 2024-11-12 RX ORDER — TAMSULOSIN HYDROCHLORIDE 0.4 MG/1
CAPSULE ORAL
Qty: 30 CAPSULE | Refills: 1 | Status: SHIPPED | OUTPATIENT
Start: 2024-11-12
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
## 2024-11-18 ENCOUNTER — PROCEDURE VISIT (OUTPATIENT)
Dept: UROLOGY | Facility: CLINIC | Age: 59
End: 2024-11-18
Payer: OTHER GOVERNMENT

## 2024-11-18 DIAGNOSIS — N41.8 OTHER PROSTATIC INFLAMMATORY DISEASES: ICD-10-CM

## 2024-11-18 DIAGNOSIS — N40.1 BENIGN PROSTATIC HYPERPLASIA WITH WEAK URINARY STREAM: Primary | ICD-10-CM

## 2024-11-18 DIAGNOSIS — R39.12 BENIGN PROSTATIC HYPERPLASIA WITH WEAK URINARY STREAM: Primary | ICD-10-CM

## 2024-11-18 LAB
BILIRUB BLD-MCNC: NEGATIVE MG/DL
CLARITY, POC: CLEAR
COLOR UR: YELLOW
EXPIRATION DATE: ABNORMAL
GLUCOSE UR STRIP-MCNC: ABNORMAL MG/DL
KETONES UR QL: ABNORMAL
LEUKOCYTE EST, POC: NEGATIVE
Lab: ABNORMAL
NITRITE UR-MCNC: NEGATIVE MG/ML
PH UR: 6 [PH] (ref 5–8)
PROT UR STRIP-MCNC: NEGATIVE MG/DL
RBC # UR STRIP: NEGATIVE /UL
SP GR UR: 1.02 (ref 1–1.03)
UROBILINOGEN UR QL: ABNORMAL

## 2024-11-18 PROCEDURE — 81003 URINALYSIS AUTO W/O SCOPE: CPT | Performed by: UROLOGY

## 2024-11-18 PROCEDURE — 51798 US URINE CAPACITY MEASURE: CPT | Performed by: UROLOGY

## 2024-11-18 PROCEDURE — 51741 ELECTRO-UROFLOWMETRY FIRST: CPT | Performed by: UROLOGY

## 2024-11-18 PROCEDURE — 52000 CYSTOURETHROSCOPY: CPT | Performed by: UROLOGY

## 2024-11-18 NOTE — PROGRESS NOTES
Cystoscopy    Date/Time: 11/18/2024 11:34 AM    Performed by: Jean-Paul Sherman MD  Authorized by: Jean-Paul Sherman MD  Preparation: Patient was prepped and draped in the usual sterile fashion.  Local anesthesia used: yes    Anesthesia:  Local anesthesia used: yes  Local Anesthetic: lidocaine 2% without epinephrine  Anesthetic total: 12 mL    Sedation:  Patient sedated: no        Indication.  UTI.  History of left epididymoorchitis.    Patient was placed in lithotomy position.  Thorough scrubbing of lower abdomen and external genitalia was performed with Hibiclens.  18 Olympus flexible cystoscope was inserted into the urethra, which showed a soft membranous urethral stricture dilated easily with the cystoscope.   Prostate gland is large and obstructive.  Patient has large lateral lobes and no median lobe.  Bladder is trabeculated.  Both ureteral orifices are normal.  Bladder was looked at carefully and I do not see any bladder tumors.  There was no evidence of vesicocolic fistula.  Flexible cystoscope was removed and patient tolerated the procedure very well.    Uroflow.    Q-Stefan.  14.8 mL/s    Average flow.  6.5 mL/s    Voided volume.  396 mL    Intervals.  3    Bladder scan for residual.  3.5 MHz transducer was applied in the suprapubic area and amount of urine in the bladder was calculated to be 16.4 cm³ or mL.    Patient has obstructive uropathy.  Uroflow and bladder scan was done by Ms. Tova Israel and she is going to take care of the patient and follow-up.

## 2024-11-20 RX ORDER — DOXYCYCLINE 100 MG/1
100 CAPSULE ORAL 2 TIMES DAILY
Qty: 20 CAPSULE | Refills: 0 | Status: SHIPPED | OUTPATIENT
Start: 2024-11-20 | End: 2024-11-30

## 2025-01-29 ENCOUNTER — OFFICE VISIT (OUTPATIENT)
Dept: FAMILY MEDICINE CLINIC | Facility: CLINIC | Age: 60
End: 2025-01-29
Payer: OTHER GOVERNMENT

## 2025-01-29 VITALS
HEIGHT: 72 IN | BODY MASS INDEX: 33.59 KG/M2 | WEIGHT: 248 LBS | HEART RATE: 88 BPM | OXYGEN SATURATION: 96 % | DIASTOLIC BLOOD PRESSURE: 87 MMHG | SYSTOLIC BLOOD PRESSURE: 131 MMHG

## 2025-01-29 DIAGNOSIS — E55.9 VITAMIN D DEFICIENCY: ICD-10-CM

## 2025-01-29 DIAGNOSIS — K21.9 GASTROESOPHAGEAL REFLUX DISEASE WITHOUT ESOPHAGITIS: ICD-10-CM

## 2025-01-29 DIAGNOSIS — N52.9 ERECTILE DYSFUNCTION, UNSPECIFIED ERECTILE DYSFUNCTION TYPE: ICD-10-CM

## 2025-01-29 DIAGNOSIS — F51.01 PRIMARY INSOMNIA: ICD-10-CM

## 2025-01-29 DIAGNOSIS — E11.9 TYPE 2 DIABETES MELLITUS WITHOUT COMPLICATION, WITHOUT LONG-TERM CURRENT USE OF INSULIN: ICD-10-CM

## 2025-01-29 DIAGNOSIS — E78.5 HYPERLIPIDEMIA, UNSPECIFIED HYPERLIPIDEMIA TYPE: ICD-10-CM

## 2025-01-29 DIAGNOSIS — I10 PRIMARY HYPERTENSION: ICD-10-CM

## 2025-01-29 DIAGNOSIS — N40.0 BENIGN PROSTATIC HYPERPLASIA, UNSPECIFIED WHETHER LOWER URINARY TRACT SYMPTOMS PRESENT: ICD-10-CM

## 2025-01-29 DIAGNOSIS — Z00.00 ANNUAL PHYSICAL EXAM: Primary | ICD-10-CM

## 2025-01-29 PROCEDURE — 99396 PREV VISIT EST AGE 40-64: CPT | Performed by: NURSE PRACTITIONER
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
## 2025-02-11 ENCOUNTER — OFFICE VISIT (OUTPATIENT)
Dept: UROLOGY | Age: 60
End: 2025-02-11
Payer: OTHER GOVERNMENT

## 2025-02-11 VITALS — RESPIRATION RATE: 12 BRPM | BODY MASS INDEX: 33.59 KG/M2 | WEIGHT: 248 LBS | HEIGHT: 72 IN

## 2025-02-11 DIAGNOSIS — R35.0 BENIGN PROSTATIC HYPERPLASIA WITH URINARY FREQUENCY: Primary | ICD-10-CM

## 2025-02-11 DIAGNOSIS — N52.9 ERECTILE DYSFUNCTION, UNSPECIFIED ERECTILE DYSFUNCTION TYPE: ICD-10-CM

## 2025-02-11 DIAGNOSIS — R30.0 DYSURIA: ICD-10-CM

## 2025-02-11 DIAGNOSIS — N43.3 RIGHT HYDROCELE: ICD-10-CM

## 2025-02-11 DIAGNOSIS — N40.1 BENIGN PROSTATIC HYPERPLASIA WITH URINARY FREQUENCY: Primary | ICD-10-CM

## 2025-02-11 DIAGNOSIS — N41.9 PROSTATITIS, UNSPECIFIED PROSTATITIS TYPE: ICD-10-CM

## 2025-02-11 LAB — URINE VOLUME: 47

## 2025-02-11 PROCEDURE — 87086 URINE CULTURE/COLONY COUNT: CPT | Performed by: NURSE PRACTITIONER

## 2025-02-11 PROCEDURE — 99214 OFFICE O/P EST MOD 30 MIN: CPT | Performed by: NURSE PRACTITIONER

## 2025-02-11 RX ORDER — LACTOBACILLUS ACIDOPHILUS 20B CELL
1 CAPSULE ORAL DAILY
Qty: 28 CAPSULE | Refills: 0 | Status: SHIPPED | OUTPATIENT
Start: 2025-02-11

## 2025-02-11 RX ORDER — DOXYCYCLINE 100 MG/1
100 CAPSULE ORAL 2 TIMES DAILY
Qty: 56 CAPSULE | Refills: 0 | Status: SHIPPED | OUTPATIENT
Start: 2025-02-11 | End: 2025-03-11

## 2025-02-12 ENCOUNTER — TELEPHONE (OUTPATIENT)
Dept: UROLOGY | Age: 60
End: 2025-02-12
Payer: OTHER GOVERNMENT

## 2025-02-12 LAB — BACTERIA SPEC AEROBE CULT: NO GROWTH

## 2025-02-12 NOTE — PROGRESS NOTES
Please let the patient know that he had a negative urine culture and there is no urinary tract infection.  I do want him to complete his antibiotics for treatment of a prostatitis.  Follow-up as scheduled or sooner as needed for any worsening or new symptoms.  Thank you.

## 2025-02-12 NOTE — TELEPHONE ENCOUNTER
PER MARCO CEVALLOS PATIENT WAS CALLED AND ADVISED OF NEGATIVE URINE CULTURE.  PATIENT WAS ALSO ADVISED TO CONTINUE AND COMPLETE ANTIBIOTICS.   PATIENT VOICED UNDERSTANDING AND WILL CALL THE OFFICE WITH NEW ISSUES OR CONCERNS.
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
## 2025-05-15 ENCOUNTER — OFFICE VISIT (OUTPATIENT)
Dept: UROLOGY | Age: 60
End: 2025-05-15
Payer: OTHER GOVERNMENT

## 2025-05-15 VITALS — RESPIRATION RATE: 12 BRPM | BODY MASS INDEX: 33.18 KG/M2 | WEIGHT: 245 LBS | HEIGHT: 72 IN

## 2025-05-15 DIAGNOSIS — N52.9 ERECTILE DYSFUNCTION, UNSPECIFIED ERECTILE DYSFUNCTION TYPE: ICD-10-CM

## 2025-05-15 DIAGNOSIS — N43.3 RIGHT HYDROCELE: ICD-10-CM

## 2025-05-15 DIAGNOSIS — N40.1 BENIGN PROSTATIC HYPERPLASIA WITH URINARY FREQUENCY: Primary | ICD-10-CM

## 2025-05-15 DIAGNOSIS — R35.0 BENIGN PROSTATIC HYPERPLASIA WITH URINARY FREQUENCY: Primary | ICD-10-CM

## 2025-05-15 LAB — URINE VOLUME: 0

## 2025-05-15 PROCEDURE — 99213 OFFICE O/P EST LOW 20 MIN: CPT | Performed by: NURSE PRACTITIONER

## 2025-08-15 ENCOUNTER — OFFICE VISIT (OUTPATIENT)
Dept: UROLOGY | Age: 60
End: 2025-08-15
Payer: OTHER GOVERNMENT

## 2025-08-15 VITALS — BODY MASS INDEX: 33.18 KG/M2 | WEIGHT: 245 LBS | HEIGHT: 72 IN | RESPIRATION RATE: 12 BRPM

## 2025-08-15 DIAGNOSIS — R35.0 BENIGN PROSTATIC HYPERPLASIA WITH URINARY FREQUENCY: Primary | ICD-10-CM

## 2025-08-15 DIAGNOSIS — N52.9 ERECTILE DYSFUNCTION, UNSPECIFIED ERECTILE DYSFUNCTION TYPE: ICD-10-CM

## 2025-08-15 DIAGNOSIS — N43.3 RIGHT HYDROCELE: ICD-10-CM

## 2025-08-15 DIAGNOSIS — N40.1 BENIGN PROSTATIC HYPERPLASIA WITH URINARY FREQUENCY: Primary | ICD-10-CM

## 2025-08-15 DIAGNOSIS — R33.9 URINARY RETENTION: ICD-10-CM

## 2025-08-15 DIAGNOSIS — N39.41 URGE INCONTINENCE OF URINE: ICD-10-CM

## 2025-08-15 LAB — URINE VOLUME: 332

## 2025-08-15 PROCEDURE — 99213 OFFICE O/P EST LOW 20 MIN: CPT | Performed by: NURSE PRACTITIONER

## 2025-08-15 RX ORDER — TROSPIUM CHLORIDE 20 MG/1
20 TABLET, FILM COATED ORAL 2 TIMES DAILY
COMMUNITY
Start: 2025-08-12 | End: 2025-08-15

## 2025-08-15 RX ORDER — TAMSULOSIN HYDROCHLORIDE 0.4 MG/1
2 CAPSULE ORAL DAILY
Qty: 180 CAPSULE | Refills: 4 | Status: SHIPPED | OUTPATIENT
Start: 2025-08-15

## 2025-08-21 ENCOUNTER — OFFICE VISIT (OUTPATIENT)
Dept: UROLOGY | Age: 60
End: 2025-08-21
Payer: OTHER GOVERNMENT

## 2025-08-21 VITALS — BODY MASS INDEX: 33.18 KG/M2 | HEIGHT: 72 IN | RESPIRATION RATE: 12 BRPM | WEIGHT: 245 LBS

## 2025-08-21 DIAGNOSIS — N43.3 RIGHT HYDROCELE: ICD-10-CM

## 2025-08-21 DIAGNOSIS — N40.1 BENIGN PROSTATIC HYPERPLASIA WITH URINARY FREQUENCY: Primary | ICD-10-CM

## 2025-08-21 DIAGNOSIS — R35.0 BENIGN PROSTATIC HYPERPLASIA WITH URINARY FREQUENCY: Primary | ICD-10-CM

## 2025-08-21 DIAGNOSIS — N39.41 URGE INCONTINENCE OF URINE: ICD-10-CM

## 2025-08-21 DIAGNOSIS — N52.9 ERECTILE DYSFUNCTION, UNSPECIFIED ERECTILE DYSFUNCTION TYPE: ICD-10-CM

## 2025-08-21 LAB — URINE VOLUME: 33

## 2025-08-21 PROCEDURE — 99213 OFFICE O/P EST LOW 20 MIN: CPT | Performed by: NURSE PRACTITIONER

## 2025-08-21 RX ORDER — TAMSULOSIN HYDROCHLORIDE 0.4 MG/1
2 CAPSULE ORAL DAILY
Qty: 180 CAPSULE | Refills: 4 | Status: SHIPPED | OUTPATIENT
Start: 2025-08-21